# Patient Record
Sex: FEMALE | Race: WHITE | NOT HISPANIC OR LATINO | Employment: OTHER | URBAN - METROPOLITAN AREA
[De-identification: names, ages, dates, MRNs, and addresses within clinical notes are randomized per-mention and may not be internally consistent; named-entity substitution may affect disease eponyms.]

---

## 2017-06-22 ENCOUNTER — ALLSCRIPTS OFFICE VISIT (OUTPATIENT)
Dept: OTHER | Facility: OTHER | Age: 62
End: 2017-06-22

## 2017-10-16 DIAGNOSIS — R73.01 IMPAIRED FASTING GLUCOSE: ICD-10-CM

## 2017-10-16 DIAGNOSIS — E78.2 MIXED HYPERLIPIDEMIA: ICD-10-CM

## 2017-10-16 DIAGNOSIS — E87.6 HYPOKALEMIA: ICD-10-CM

## 2017-10-16 DIAGNOSIS — I10 ESSENTIAL (PRIMARY) HYPERTENSION: ICD-10-CM

## 2017-10-31 ENCOUNTER — ALLSCRIPTS OFFICE VISIT (OUTPATIENT)
Dept: OTHER | Facility: OTHER | Age: 62
End: 2017-10-31

## 2017-11-01 NOTE — PROGRESS NOTES
Assessment  1  Bunion of great toe of right foot (727 1) (M21 611)   2  Encounter for preventive health examination (V70 0) (Z00 00)    Plan  Need for influenza vaccination    · Fluzone Quadrivalent 0 5 ML Intramuscular Suspension Prefilled Syringe    Chief Complaint  mm - review labs    trouble with right foot    pap      History of Present Illness  64year old female notes bunion of right great toe  States that it is chronic pain worse with walking  Wears tight fitting shoes  Review of Systems    Constitutional: No fever, no chills, feels well, no tiredness, no recent weight gain or weight loss  Cardiovascular: No complaints of slow heart rate, no fast heart rate, no chest pain, no palpitations, no leg claudication, no lower extremity edema  Respiratory: No complaints of shortness of breath, no wheezing, no cough, no SOB on exertion, no orthopnea, no PND  Gastrointestinal: No complaints of abdominal pain, no constipation, no nausea or vomiting, no diarrhea, no bloody stools  Musculoskeletal: as noted in HPI  Integumentary: No complaints of skin rash or lesions, no itching, no skin wounds, no breast pain or lump  Neurological: No complaints of headache, no confusion, no convulsions, no numbness, no dizziness or fainting, no tingling, no limb weakness, no difficulty walking  Active Problems  1  Allergic rhinitis (477 9) (J30 9)   2  Arthropathy of knee (716 96) (M17 10)   3  Bacterial conjunctivitis of left eye (372 39,041 9) (H10 9)   4  Benign essential hypertension (401 1) (I10)   5  Bug bite (919 4,E906 4) (W57 XXXA)   6  Contact dermatitis (692 9) (L25 9)   7  Encounter for Papanicolaou smear for cervical cancer screening (V76 2) (Z12 4)   8  Encounter for screening mammogram for breast cancer (V76 12) (Z12 31)   9  Esophageal reflux (530 81) (K21 9)   10  Impaired fasting glucose (790 21) (R73 01)   11  Mixed hyperlipidemia (272 2) (E78 2)   12   Need for influenza vaccination (V04 81) (Z23)   13  Need for pneumococcal vaccine (V03 82) (Z23)   14  Need for shingles vaccine (V04 89) (Z23)   15  Osteopenia (733 90) (M85 80)   16  Potassium deficiency (276 8) (E87 6)   17  Pre-op exam (V72 84) (Z01 818)   18  Screening for HPV (human papillomavirus) (V73 81) (Z11 51)    Past Medical History    The active problems and past medical history were reviewed and updated today  Surgical History    The surgical history was reviewed and updated today  Family History  Mother    1  Family history of diabetes mellitus (V18 0) (Z83 3)  Family History    2  Denied: Family history of substance abuse   3  No family history of mental disorder    The family history was reviewed and updated today  Social History   · Former smoker (X53 20) (R35 080)  The social history was reviewed and updated today  The social history was reviewed and is unchanged  Current Meds   1  Alendronate Sodium 70 MG Oral Tablet; TAKE 1 TABLET EVERY WEEK; Therapy: 77MFM6671 to (Last Rx:06Mar2017)  Requested for: 04WMT8126; Status: ACTIVE   - Retrospective By Protocol Authorization Ordered   2  Azithromycin 250 MG Oral Tablet; TAKE 2 TABLETS ON DAY 1 THEN TAKE 1 TABLET A   DAY FOR 4 DAYS; Therapy: 43MOW2266 to (Last Rx:19Ywj5914)  Requested for: 74Rdt7719 Ordered   3  Esomeprazole Magnesium 40 MG Oral Capsule Delayed Release; take 1 capsule daily; Therapy: 23Cxh3666 to (Evaluate:98Qmx6586)  Requested for: 08AHC7151; Last   Rx:16Oct2017; Status: ACTIVE - Retrospective By Protocol Authorization Ordered   4  Losartan Potassium 100 MG Oral Tablet; Take 1 tablet daily; Therapy: 88YWC1502 to (Last Rx:49Oms2333)  Requested for: 60FYW9653 Ordered   5  Nasacort AQ 55 MCG/ACT AERS; Therapy: (([phone]) to Recorded   6  Triamcinolone Acetonide 0 1 % External Ointment; APPLY AND GENTLY MASSAGE INTO   AFFECTED AREA(S) TWICE DAILY;    Therapy: 63Aaq6091 to (Last Rx:27Apr2016)  Requested for: 69Ykt1290 Ordered    The medication list was reviewed and updated today  Allergies  1  No Known Drug Allergies    Vitals  Vital Signs    Recorded: 77VGP6408 03:34PM   Heart Rate 94   Respiration 16   Systolic 582   Diastolic 80   Height 5 ft 1 in   Weight 122 lb    BMI Calculated 23 05   BSA Calculated 1 53   O2 Saturation 98     Physical Exam    Constitutional   General appearance: No acute distress, well appearing and well nourished  Pulmonary   Respiratory effort: No increased work of breathing or signs of respiratory distress  Auscultation of lungs: Clear to auscultation  Cardiovascular   Auscultation of heart: Normal rate and rhythm, normal S1 and S2, without murmurs  Examination of extremities for edema and/or varicosities: Normal     Lymphatic   Palpation of lymph nodes in neck: No lymphadenopathy  Musculoskeletal   Gait and station: Normal     Digits and nails: Abnormal  -- Bunion of great toe on right foot  Inspection/palpation of joints, bones, and muscles: Normal     Skin   Skin and subcutaneous tissue: Normal without rashes or lesions  Psychiatric   Orientation to person, place, and time: Normal     Mood and affect: Normal          Health Management  Encounter for Papanicolaou smear for cervical cancer screening   (every) [facility] FPGS PAP AND HR HPV DNA; every 5 years; Last 46VYS5454; Next Due:  64VBH3280; Active  Screening for HPV (human papillomavirus)   (every) [facility] FPGS PAP AND HR HPV DNA; every 5 years; Last 85CXN8299; Next Due:  35TLD9783;  Active    Signatures   Electronically signed by : Eliu Galindo MD; Oct 31 2017  4:00PM EST                       (Author)

## 2017-11-14 ENCOUNTER — ALLSCRIPTS OFFICE VISIT (OUTPATIENT)
Dept: OTHER | Facility: OTHER | Age: 62
End: 2017-11-14

## 2017-11-16 LAB
ADEQUACY: (HISTORICAL): NORMAL
CLINICIAN PROVIDIED ICD 9 OR 10 (HISTORICAL): NORMAL
COMMENT (HISTORICAL): NORMAL
DIAGNOSIS (HISTORICAL): NORMAL
HPV HIGH RISK (HISTORICAL): NEGATIVE
PERFORMED BY (HISTORICAL): NORMAL
TEST INFORMATION (HISTORICAL): NORMAL

## 2017-11-17 ENCOUNTER — GENERIC CONVERSION - ENCOUNTER (OUTPATIENT)
Dept: OTHER | Facility: OTHER | Age: 62
End: 2017-11-17

## 2018-01-10 NOTE — MISCELLANEOUS
Assessment    1  Benign essential hypertension (401 1) (I10)    Plan  Benign essential hypertension    · Losartan Potassium 100 MG Oral Tablet; Take 1 tablet daily   Rx By: Rajani Cespedes; Dispense: 0 Days ; #:90 Tablet; Refill: 3; For: Benign essential hypertension; TIMI = N; Sent To: [facility]  Mixed hyperlipidemia    · Klor-Con 10 10 MEQ Oral Tablet Extended Release   Rx By: Rajanilizz CasianoEl Refugio; Dispense: 0 Days ; #:90 TAB; Refill: 1; For: Mixed hyperlipidemia; TIMI = N; Transmitted To: [facility]  Potassium deficiency    · Potassium Chloride ER 10 MEQ Oral Tablet Extended Release   Rx By: Rajani El Refugio; Dispense: 0 Days ; #:90 TAB; Refill: 0; For: Potassium deficiency; TIMI = N; Transmitted To: [facility]    Chief Complaint  Chief Complaint Free Text Note Form: HARINDER - TKR      History of Present Illness  TCM Communication [facility]: The patient is being contacted for follow-up after hospitalization and knee replcaement  Hospital course was discussed with the inpatient physician and records were reviewed  She was hospitalized at [facility]  The date of admission: 10/26/16, date of discharge: 11/3/16  Communication performed and completed by   HPI: f/u after hosp--rev meds      Review of Systems  Complete-Female:   Constitutional: No fever, no chills, feels well, no tiredness, no recent weight gain or weight loss  Cardiovascular: No complaints of slow heart rate, no fast heart rate, no chest pain, no palpitations, no leg claudication, no lower extremity edema  Respiratory: No complaints of shortness of breath, no wheezing, no cough, no SOB on exertion, no orthopnea, no PND  Musculoskeletal: as noted in HPI  Active Problems    1  Allergic rhinitis (477 9) (J30 9)   2  Arthropathy of knee (716 96) (M12 9)   3  Bacterial conjunctivitis of left eye (372 39,041 9) (H10 9)   4  Benign essential hypertension (401 1) (I10)   5   Contact dermatitis (692 9) (L25 9)   6  Encounter for screening mammogram for breast cancer (V76 12) (Z12 31)   7  Esophageal reflux (530 81) (K21 9)   8  Impaired fasting glucose (790 21) (R73 01)   9  Mixed hyperlipidemia (272 2) (E78 2)   10  Need for influenza vaccination (V04 81) (Z23)   11  Need for pneumococcal vaccine (V03 82) (Z23)   12  Need for shingles vaccine (V04 89) (Z23)   13  Osteopenia (733 90) (M85 80)   14  Potassium deficiency (276 8) (E87 6)   15  Pre-op exam (V72 84) (Z01 818)    Family History  Mother    1  Family history of diabetes mellitus (V18 0) (Z83 3)    Social History    · Former smoker (V15 82) (S24 833)    Current Meds   1  Alendronate Sodium 70 MG Oral Tablet; TAKE 1 TABLET EVERY WEEK; Therapy: 09WCM1463 to (Last Bhavesh Riggins)  Requested for: 28Jun2016 Ordered   2  Esomeprazole Magnesium 40 MG Oral Capsule Delayed Release; take 1 capsule daily; Therapy: 21Apr2016 to (Evaluate:16Jan2017)  Requested for: 21Apr2016; Last   Rx:21Apr2016 Ordered   3  Klor-Con 10 10 MEQ Oral Tablet Extended Release; Take 1 tablet daily; Therapy: 72Zwk5717 to (Last Vernell Cole)  Requested for: 84Qje7449 Ordered   4  Losartan Potassium 100 MG Oral Tablet; Take 1 tablet daily; Therapy: 83IJK1356 to (Last Rx:30Xzl5713)  Requested for: 55BSW2317 Ordered   5  Nasacort AQ 55 MCG/ACT AERS; Therapy: (78 119 58 38) to Recorded   6  Potassium Chloride ER 10 MEQ Oral Tablet Extended Release; Take 1 tablet daily; Therapy: 38VMD0052 to (Last Rx:23Nov2016)  Requested for: 23Nov2016 Ordered   7  Triamcinolone Acetonide 0 1 % External Ointment; APPLY AND GENTLY MASSAGE INTO   AFFECTED AREA(S) TWICE DAILY; Therapy: 27Apr2016 to (Last Rx:27Apr2016)  Requested for: 27Apr2016 Ordered    Allergies    1   No Known Drug Allergies    Vitals  Signs   Recorded: 66JIZ2975 07:28AM   Heart Rate: 84  Respiration: 16  Systolic: 223  Diastolic: 90  Height: 5 ft 1 in  Weight: 136 lb   BMI Calculated: 25 7  BSA Calculated: 1 6  O2 Saturation: 97    Physical Exam    Constitutional   General appearance: No acute distress, well appearing and well nourished  Pulmonary   Auscultation of lungs: Clear to auscultation  Cardiovascular   Auscultation of heart: Normal rate and rhythm, normal S1 and S2, without murmurs  Abdomen   Abdomen: Non-tender, no masses  Results/Data  PHQ-2 Adult Depression Screening 00Atk0465 07:29AM User, Ahs     Test Name Result Flag Reference   PHQ-2 Adult Depression Score 0     Over the last two weeks, how often have you been bothered by any of the following problems?   Little interest or pleasure in doing things: Not at all - 0  Feeling down, depressed, or hopeless: Not at all - 0   PHQ-2 Adult Depression Screening Negative         Signatures   Electronically signed by : Sonya Burgess MD; Dec  1 2016  7:48AM EST                       (Author)

## 2018-01-12 VITALS
HEIGHT: 61 IN | BODY MASS INDEX: 23.03 KG/M2 | OXYGEN SATURATION: 98 % | HEART RATE: 94 BPM | SYSTOLIC BLOOD PRESSURE: 120 MMHG | RESPIRATION RATE: 16 BRPM | WEIGHT: 122 LBS | DIASTOLIC BLOOD PRESSURE: 80 MMHG

## 2018-01-13 VITALS
HEART RATE: 76 BPM | OXYGEN SATURATION: 98 % | DIASTOLIC BLOOD PRESSURE: 60 MMHG | BODY MASS INDEX: 23.03 KG/M2 | HEIGHT: 61 IN | SYSTOLIC BLOOD PRESSURE: 110 MMHG | WEIGHT: 122 LBS | RESPIRATION RATE: 16 BRPM

## 2018-01-15 NOTE — RESULT NOTES
Verified Results  (LC) PapIG, HPV, rfx 16/18 28ZPF9408 12:00AM [facility]   No  of containers  Jimbo Manifold 01 ThinPrep Vial     Test Name Result Flag Reference   DIAGNOSIS:      NEGATIVE FOR INTRAEPITHELIAL LESION AND MALIGNANCY  NEGATIVE FOR INTRAEPITHELIAL LESION AND MALIGNANCY  Specimen adequacy:      Satisfactory for evaluation  Endocervical component may not be  distinguished in cases of atrophy  Satisfactory for evaluation  Endocervical component may not be  distinguished in cases of atrophy  Clinician provided ICD10: I90 644  Z12 4     Z01 419  Z12 4   Performed by:      Gera Fan Cytotechnologist   Gera Fan Cytotechnologist   Comm   Note: (Report)     The Pap smear is a screening test designed to aid in the detection of  premalignant and malignant conditions of the uterine cervix  It is not a  diagnostic procedure and should not be used as the sole means of detecting  cervical cancer  Both false-positive and false-negative reports do occur  The Pap smear is a screening test designed to aid in the detection of  premalignant and malignant conditions of the uterine cervix  It is not a  diagnostic procedure and should not be used as the sole means of detecting  cervical cancer  Both false-positive and false-negative reports do occur  Test Methodology: This liquid based ThinPrep(R) pap test was screened with the  use of an image guided system  This liquid based ThinPrep(R) pap test was screened with the  use of an image guided system  HPV, high-risk Negative  Negative   This high-risk HPV test detects thirteen high-risk types  (16/18/31/33/35/39/45/51/52/56/58/59/68) without differentiation  DIAGNOSIS:      NEGATIVE FOR INTRAEPITHELIAL LESION AND MALIGNANCY  NEGATIVE FOR INTRAEPITHELIAL LESION AND MALIGNANCY  Specimen adequacy:      Satisfactory for evaluation  Endocervical component may not be  distinguished in cases of atrophy  Satisfactory for evaluation  Endocervical component may not be  distinguished in cases of atrophy  Clinician provided ICD10: C85 741  Z12 4     Z01 419  Z12 4   Performed by:      Kael Foss Cytotechnologist   Kael Foss Cytotechnologist   Comm   Note: (Report)     The Pap smear is a screening test designed to aid in the detection of  premalignant and malignant conditions of the uterine cervix  It is not a  diagnostic procedure and should not be used as the sole means of detecting  cervical cancer  Both false-positive and false-negative reports do occur  The Pap smear is a screening test designed to aid in the detection of  premalignant and malignant conditions of the uterine cervix  It is not a  diagnostic procedure and should not be used as the sole means of detecting  cervical cancer  Both false-positive and false-negative reports do occur  Test Methodology: This liquid based ThinPrep(R) pap test was screened with the  use of an image guided system  This liquid based ThinPrep(R) pap test was screened with the  use of an image guided system  HPV, high-risk Negative  Negative   This high-risk HPV test detects thirteen high-risk types  (16/18/31/33/35/39/45/51/52/56/58/59/68) without differentiation

## 2018-01-16 NOTE — PROGRESS NOTES
Assessment    1  Bacterial conjunctivitis of left eye (372 39,041 9) (H10 9)    Plan  Bacterial conjunctivitis of left eye    · Tobramycin 0 3 % Ophthalmic Solution; INITIALLY 2 DROPS EVERY HOUR FOR  DAY 1, THEN 2 DROPS 4 TIMES DAILY    Chief Complaint  PINK EYE LEFT EYE      History of Present Illness  HPI: had a stye in left eye yesterday, was itching eye, today woke up with left eye crusted shut, drainage, burning      Review of Systems    Constitutional: no fever and no chills  ENT: nasal discharge, but no sore throat    The patient presents with complaints of mild left earache  Respiratory: cough  Breasts: no complaints of breast pain, breast lump or nipple discharge  Gastrointestinal: no complaints of abdominal pain, no constipation, no nausea or diarrhea, no vomiting, no bloody stools  Genitourinary: no complaints of dysuria, no incontinence, no pelvic pain, no dysmenorrhea, no vaginal discharge or abnormal vaginal bleeding  Musculoskeletal: no complaints of arthralgia, no myalgia, no joint swelling or stiffness, no limb pain or swelling  Integumentary: no complaints of skin rash or lesion, no itching or dry skin, no skin wounds  Neurological: no complaints of headache, no confusion, no numbness or tingling, no dizziness or fainting  Active Problems    1  Allergic rhinitis (477 9) (J30 9)   2  Benign essential hypertension (401 1) (I10)   3  Esophageal reflux (530 81) (K21 9)   4  Impaired fasting glucose (790 21) (R73 01)   5  Mixed hyperlipidemia (272 2) (E78 2)   6  Need for influenza vaccination (V04 81) (Z23)   7  Need for pneumococcal vaccine (V03 82) (Z23)    Past Medical History  Active Problems And Past Medical History Reviewed: The active problems and past medical history were reviewed and updated today  Family History    1  Family history of diabetes mellitus (V18 0) (Z83 3)  Family History Reviewed: The family history was reviewed and updated today         Social History    · Former smoker (S16 73) (N01 429)   · QUIT 11/1/2013  The social history was reviewed and updated today  Surgical History  Surgical History Reviewed: The surgical history was reviewed and updated today  Current Meds   1  Alendronate Sodium 70 MG Oral Tablet; TAKE 1 TABLET ONCE WEEKLY; Therapy: (Recorded:09Oct2015) to Recorded   2  Esomeprazole Magnesium 40 MG Oral Capsule Delayed Release; TAKE 1 CAPSULE   DAILY; Therapy: (Recorded:09Oct2015) to Recorded   3  Klor-Con 10 10 MEQ Oral Tablet Extended Release; Take 1 tablet daily; Therapy: 21Dec2015 to (Last Turner Hurl)  Requested for: 21Dec2015 Ordered   4  Losartan Potassium 100 MG Oral Tablet; TAKE 1 TABLET DAILY; Therapy: (Recorded:09Oct2015) to Recorded   5  Nasacort AQ 55 MCG/ACT AERS; Therapy: (Recorded:09Oct2015) to Recorded    The medication list was reviewed and updated today  Allergies    1  No Known Drug Allergies    Vitals   Recorded: 11YCA6635 69:37PP   Systolic 352   Diastolic 74   Height 5 ft 1 in   Weight 136 lb    BMI Calculated 25 7   BSA Calculated 1 6     Physical Exam    Constitutional   General appearance: No acute distress, well appearing and well nourished  Eyes   Conjunctiva and lids: Abnormal   Conjunctiva Findings: left hyperemia, purulent discharge on the left and increased tearing on the left, but normal right conjunctiva  Eye Lids: normal bilaterally  Ears, Nose, Mouth, and Throat   External inspection of ears and nose: Normal     Otoscopic examination: Tympanic membranes translucent with normal light reflex  Canals patent without erythema  Nasal mucosa, septum, and turbinates: Normal without edema or erythema  Oropharynx: Normal with no erythema, edema, exudate or lesions  Pulmonary   Respiratory effort: No increased work of breathing or signs of respiratory distress  Auscultation of lungs: Clear to auscultation      Cardiovascular   Auscultation of heart: Normal rate and rhythm, normal S1 and S2, without murmurs      Musculoskeletal   Gait and station: Normal     Psychiatric   Orientation to person, place, and time: Normal     Mood and affect: Normal          Signatures   Electronically signed by : Adalberto Ramsye; Jan 29 2016  1:09PM EST                       (Author)    Electronically signed by : Rodger Sapp MD; Jan 29 2016  5:06PM EST

## 2018-01-23 NOTE — PROGRESS NOTES
Assessment   1  Bunion of great toe of right foot (727 1) (M21 611)  2  Encounter for preventive health examination (V70 0) (Z00 00)    Plan  Need for influenza vaccination    · Administered: Fluzone Quadrivalent 0 5 ML Intramuscular Suspension Prefilled Syringe    Discussion/Summary  health maintenance visit Currently, she eats a healthy diet  Advice and education were given regarding nutrition, aerobic exercise, weight bearing exercise, weight loss and sunscreen use  Chief Complaint  annual hm    Advance Directives  Advance Directive [facility]:   NO - Patient does not have an advance health care directive  1         1 Amended By: Leighann Lee; Oct 31 2017 4:47 PM EST    History of Present Illness    HM, Adult Female: The patient is being seen for a health maintenance evaluation  Social History:1  Household members include1  spouse1 , 1 daughter(s)1  and 2 son(s)1   She is1  married1   Work status:1  working full time1   The patient  is a former cigarette smoker1  1   She reports  occasional alcohol use1  and  denies binge drinking1  1   Alcohol concern: 1  no personal concern about alcohol use1   General Health: The patient's health since the last visit is described as1  good1   She has regular dental visits1   She complains of vision problems1   Vision care includes1  wearing glasses1   She denies hearing loss1   Immunizations status:1  up to date1   Lifestyle:1   She consumes a diverse and healthy diet1   Reproductive health:1  the patient is postmenopausal1   Screenin Amended By: Leighann Lee; Oct 31 2017 4:48 PM EST    Review of Systems    Constitutional: No fever, no chills, feels well, no tiredness, no recent weight gain or weight loss  Cardiovascular: No complaints of slow heart rate, no fast heart rate, no chest pain, no palpitations, no leg claudication, no lower extremity edema     Respiratory: No complaints of shortness of breath, no wheezing, no cough, no SOB on exertion, no orthopnea, no PND  Gastrointestinal: No complaints of abdominal pain, no constipation, no nausea or vomiting, no diarrhea, no bloody stools  Integumentary: No complaints of skin rash or lesions, no itching, no skin wounds, no breast pain or lump  Active Problems   1  Allergic rhinitis (477 9) (J30 9)  2  Arthropathy of knee (716 96) (M17 10)  3  Bacterial conjunctivitis of left eye (372 39,041 9) (H10 9)  4  Benign essential hypertension (401 1) (I10)  5  Bug bite (919 4,E906 4) (W57 XXXA)  6  Contact dermatitis (692 9) (L25 9)  7  Encounter for Papanicolaou smear for cervical cancer screening (V76 2) (Z12 4)  8  Encounter for screening mammogram for breast cancer (V76 12) (Z12 31)  9  Esophageal reflux (530 81) (K21 9)  10  Impaired fasting glucose (790 21) (R73 01)  11  Mixed hyperlipidemia (272 2) (E78 2)  12  Need for influenza vaccination (V04 81) (Z23)  13  Need for pneumococcal vaccine (V03 82) (Z23)  14  Need for shingles vaccine (V04 89) (Z23)  15  Osteopenia (733 90) (M85 80)  16  Potassium deficiency (276 8) (E87 6)  17  Pre-op exam (V72 84) (Z01 818)  18  Screening for HPV (human papillomavirus) (V73 81) (Z11 51)    Family History  Mother    · Family history of diabetes mellitus (V18 0) (Z83 3)  Family History    · Denied: Family history of substance abuse   · No family history of mental disorder    Social History    · Former smoker (B64 12) (I14 165)   · QUIT 11/1/2013    Current Meds  1  Alendronate Sodium 70 MG Oral Tablet; TAKE 1 TABLET EVERY WEEK; Therapy: 13PDN5673 to (Last Rx:06Mar2017)  Requested for: 42IBV1317; Status:   ACTIVE - Retrospective By Protocol Authorization Ordered  2  Azithromycin 250 MG Oral Tablet; TAKE 2 TABLETS ON DAY 1 THEN TAKE 1 TABLET A   DAY FOR 4 DAYS; Therapy: 29YCY7489 to (Last Rx:19Sor2929)  Requested for: 93Bib8057 Ordered  3  Esomeprazole Magnesium 40 MG Oral Capsule Delayed Release; take 1 capsule daily;    Therapy: 21Apr2016 to (Evaluate:93Zzd7266)  Requested for: 04KVZ2895; Last   Rx:16Oct2017; Status: ACTIVE - Retrospective By Protocol Authorization Ordered  4  Losartan Potassium 100 MG Oral Tablet; Take 1 tablet daily; Therapy: 27RRB3813 to (Last Rx:30Yxo3767)  Requested for: 68RBX3640 Ordered  5  Nasacort AQ 55 MCG/ACT AERS; Therapy: (445 89 163) to Recorded  6  Triamcinolone Acetonide 0 1 % External Ointment; APPLY AND GENTLY MASSAGE   INTO AFFECTED AREA(S) TWICE DAILY; Therapy: 15Nma9154 to (Last Rx:50Pea0844)  Requested for: 10Amk3375 Ordered    Allergies   1  No Known Drug Allergies    Vitals   Recorded: 34RBK5872 03:34PM   Heart Rate 94   Respiration 16   Systolic 258   Diastolic 80   Height 5 ft 1 in   Weight 122 lb    BMI Calculated 23 05   BSA Calculated 1 53   O2 Saturation 98     Physical Exam    Constitutional   General appearance: No acute distress, well appearing and well nourished  Neck   Neck: Supple, symmetric, trachea midline, no masses  Thyroid: Normal, no thyromegaly  Pulmonary   Respiratory effort: No increased work of breathing or signs of respiratory distress  Auscultation of lungs: Clear to auscultation  Cardiovascular   Auscultation of heart: Normal rate and rhythm, normal S1 and S2, no murmurs  Examination of extremities for edema and/or varicosities: Normal     Lymphatic   Palpation of lymph nodes in neck: No lymphadenopathy  Musculoskeletal   Gait and station: Normal     Digits and nails: Abnormal   bunion of right great toe  Joints, bones, and muscles: Normal     Skin   Skin and subcutaneous tissue: Normal without rashes or lesions  Palpation of skin and subcutaneous tissue: Normal turgor  Psychiatric   Orientation to person, place, and time: Normal     Mood and affect: Normal        Health Management  Encounter for Papanicolaou smear for cervical cancer screening   (every) [employer] FPGS PAP AND HR HPV DNA; every 5 years; Last 31BXC4396;  Next  Due: 30YIU2269; Active  Screening for HPV (human papillomavirus)   (every) [employer] FPGS PAP AND HR HPV DNA; every 5 years; Last 97DPS7782; Next  Due: 25OOP4232;  Active    Signatures   Electronically signed by : Colleen Blair MD; Nov 2 2017  5:48AM EST                       (Author)

## 2018-02-07 ENCOUNTER — OFFICE VISIT (OUTPATIENT)
Dept: FAMILY MEDICINE CLINIC | Facility: CLINIC | Age: 63
End: 2018-02-07
Payer: COMMERCIAL

## 2018-02-07 VITALS
DIASTOLIC BLOOD PRESSURE: 60 MMHG | RESPIRATION RATE: 16 BRPM | BODY MASS INDEX: 22.66 KG/M2 | HEIGHT: 61 IN | WEIGHT: 120 LBS | OXYGEN SATURATION: 97 % | TEMPERATURE: 98.1 F | HEART RATE: 98 BPM | SYSTOLIC BLOOD PRESSURE: 108 MMHG

## 2018-02-07 DIAGNOSIS — J01.01 ACUTE RECURRENT MAXILLARY SINUSITIS: Primary | ICD-10-CM

## 2018-02-07 PROBLEM — M21.611 BUNION OF GREAT TOE OF RIGHT FOOT: Status: ACTIVE | Noted: 2017-10-31

## 2018-02-07 PROCEDURE — 99213 OFFICE O/P EST LOW 20 MIN: CPT | Performed by: FAMILY MEDICINE

## 2018-02-07 RX ORDER — ALENDRONATE SODIUM 70 MG/1
1 TABLET ORAL WEEKLY
COMMUNITY
Start: 2016-06-28 | End: 2018-05-03 | Stop reason: SDUPTHER

## 2018-02-07 RX ORDER — AMOXICILLIN 500 MG/1
500 CAPSULE ORAL 3 TIMES DAILY
Qty: 30 CAPSULE | Refills: 0 | Status: SHIPPED | OUTPATIENT
Start: 2018-02-07 | End: 2018-02-17

## 2018-02-07 RX ORDER — ESOMEPRAZOLE MAGNESIUM 40 MG/1
1 CAPSULE, DELAYED RELEASE ORAL DAILY
COMMUNITY
Start: 2016-04-21 | End: 2018-04-14 | Stop reason: SDUPTHER

## 2018-02-07 RX ORDER — FEXOFENADINE HCL 180 MG/1
TABLET ORAL
COMMUNITY

## 2018-02-07 RX ORDER — LOSARTAN POTASSIUM 100 MG/1
1 TABLET ORAL DAILY
COMMUNITY
Start: 2016-05-11 | End: 2018-05-26 | Stop reason: SDUPTHER

## 2018-02-07 RX ORDER — GUAIFENESIN AND CODEINE PHOSPHATE 100; 10 MG/5ML; MG/5ML
10 SOLUTION ORAL 4 TIMES DAILY PRN
Qty: 240 ML | Refills: 0 | Status: SHIPPED | OUTPATIENT
Start: 2018-02-07 | End: 2018-11-14

## 2018-02-07 NOTE — PROGRESS NOTES
Assessment/Plan:    No problem-specific Assessment & Plan notes found for this encounter  Diagnoses and all orders for this visit:    Acute recurrent maxillary sinusitis    Other orders  -     alendronate (FOSAMAX) 70 mg tablet; Take 1 tablet by mouth once a week  -     esomeprazole (NexIUM) 40 MG capsule; Take 1 capsule by mouth daily  -     losartan (COZAAR) 100 MG tablet; Take 1 tablet by mouth daily  -     fexofenadine (ALLEGRA) 180 MG tablet; Take by mouth  -     triamcinolone (KENALOG) 0 1 % ointment; Apply topically 2 (two) times a day        Patient Instructions     Take antibiotic for the full 10 days  Use the cough syrup as needed for cough  Use Tylenol or ibuprofen for aches pains and fevers  Maintain your hydration  Recheck as needed  Subjective:      Patient ID: Yvonne Shah is a 58 y o  female  Ill over the last 2-3 days  Sinus congestion and chest congestion  Some productive cough with purulent nasal discharge  Patient feels feverish, with chills, and some body aches  Did minimal relief with over-the-counter medications  Generalized Body Aches   Associated symptoms include congestion, rhinorrhea, a sore throat, coughing and vomiting  Pertinent negatives include no ear discharge, ear pain, eye discharge, eye itching, eye redness, fever, chest pain, shortness of breath, wheezing, abdominal pain, diarrhea or nausea  The following portions of the patient's history were reviewed and updated as appropriate: allergies, current medications, past family history, past medical history, past social history and problem list     Review of Systems   Constitutional: Positive for appetite change, chills and diaphoresis  Negative for fever  HENT: Positive for congestion, rhinorrhea, sinus pressure and sore throat  Negative for ear discharge and ear pain  Eyes: Negative for discharge, redness and itching  Respiratory: Positive for cough   Negative for shortness of breath and wheezing  Cardiovascular: Negative for chest pain and palpitations  Rapid or slow heart rate   Gastrointestinal: Positive for vomiting  Negative for abdominal pain, diarrhea and nausea  Vitals:    02/07/18 0917   BP: 108/60   Pulse: 98   Resp: 16   Temp: 98 1 °F (36 7 °C)   SpO2: 97%     Objective:     Physical Exam   Constitutional: She appears well-developed and well-nourished  No distress  HENT:   Head: Normocephalic and atraumatic  Right Ear: Tympanic membrane and external ear normal  No drainage  Left Ear: Tympanic membrane normal  There is drainage  Nose: Rhinorrhea and sinus tenderness present  Right sinus exhibits maxillary sinus tenderness and frontal sinus tenderness  Left sinus exhibits maxillary sinus tenderness and frontal sinus tenderness  Mouth/Throat: Posterior oropharyngeal erythema present  No oropharyngeal exudate  Eyes: Conjunctivae and EOM are normal  Right eye exhibits no discharge  Left eye exhibits no discharge  Neck: Normal range of motion  Neck supple  No thyromegaly present  Cardiovascular: Normal rate, regular rhythm and normal heart sounds  Pulmonary/Chest: Effort normal  No respiratory distress  She has no wheezes  She has no rales  Abdominal: Soft  There is no tenderness  There is no rebound and no guarding  Lymphadenopathy:     She has cervical adenopathy  Skin: Skin is warm and dry

## 2018-02-07 NOTE — PATIENT INSTRUCTIONS
Take antibiotic for the full 10 days  Use the cough syrup as needed for cough  Use Tylenol or ibuprofen for aches pains and fevers  Maintain your hydration  Recheck as needed

## 2018-04-14 DIAGNOSIS — K21.00 GASTROESOPHAGEAL REFLUX DISEASE WITH ESOPHAGITIS: Primary | ICD-10-CM

## 2018-04-14 RX ORDER — ESOMEPRAZOLE MAGNESIUM 40 MG/1
CAPSULE, DELAYED RELEASE ORAL
Qty: 90 CAPSULE | Refills: 1 | Status: SHIPPED | OUTPATIENT
Start: 2018-04-14 | End: 2018-10-11 | Stop reason: SDUPTHER

## 2018-05-03 DIAGNOSIS — M81.0 OSTEOPOROSIS, UNSPECIFIED OSTEOPOROSIS TYPE, UNSPECIFIED PATHOLOGICAL FRACTURE PRESENCE: Primary | ICD-10-CM

## 2018-05-03 RX ORDER — ALENDRONATE SODIUM 70 MG/1
TABLET ORAL
Qty: 12 TABLET | Refills: 5 | Status: SHIPPED | OUTPATIENT
Start: 2018-05-03 | End: 2019-02-22

## 2018-05-26 DIAGNOSIS — I10 ESSENTIAL HYPERTENSION: Primary | ICD-10-CM

## 2018-05-29 RX ORDER — LOSARTAN POTASSIUM 100 MG/1
TABLET ORAL
Qty: 90 TABLET | Refills: 1 | Status: SHIPPED | OUTPATIENT
Start: 2018-05-29 | End: 2018-11-25 | Stop reason: SDUPTHER

## 2018-06-05 ENCOUNTER — OFFICE VISIT (OUTPATIENT)
Dept: FAMILY MEDICINE CLINIC | Facility: CLINIC | Age: 63
End: 2018-06-05
Payer: COMMERCIAL

## 2018-06-05 VITALS
HEIGHT: 61 IN | BODY MASS INDEX: 23.6 KG/M2 | WEIGHT: 125 LBS | SYSTOLIC BLOOD PRESSURE: 130 MMHG | DIASTOLIC BLOOD PRESSURE: 80 MMHG

## 2018-06-05 DIAGNOSIS — B34.9 VIRAL SYNDROME: Primary | ICD-10-CM

## 2018-06-05 PROCEDURE — 99213 OFFICE O/P EST LOW 20 MIN: CPT | Performed by: FAMILY MEDICINE

## 2018-06-05 PROCEDURE — 3008F BODY MASS INDEX DOCD: CPT | Performed by: FAMILY MEDICINE

## 2018-06-05 NOTE — PROGRESS NOTES
[id number]         NAME: Jill Patel is a 58 y o  female  : 1955    MRN: [id number]  DATE: 2018  TIME: 3:33 PM    Assessment and Plan   Viral syndrome [B34 9]  1  Viral syndrome           Patient Instructions     Patient Instructions   Qvar samples---pred if no better--? CXR          Chief Complaint     Chief Complaint   Patient presents with    Cough     COUGH         History of Present Illness       c/o cough--1 wk      Cough   Pertinent negatives include no chest pain, chills, ear pain, eye redness, fever, rhinorrhea, sore throat, shortness of breath or wheezing  Review of Systems   Review of Systems   Constitutional: Negative for appetite change, chills, diaphoresis and fever  HENT: Negative for ear pain, rhinorrhea, sinus pressure and sore throat  Eyes: Negative for discharge, redness and itching  Respiratory: Positive for cough  Negative for shortness of breath and wheezing  Cardiovascular: Negative for chest pain and palpitations  Gastrointestinal: Negative for abdominal pain, diarrhea, nausea and vomiting           Current Medications       Current Outpatient Prescriptions:     alendronate (FOSAMAX) 70 mg tablet, TAKE 1 TABLET EVERY WEEK, Disp: 12 tablet, Rfl: 5    esomeprazole (NexIUM) 40 MG capsule, TAKE 1 CAPSULE DAILY, Disp: 90 capsule, Rfl: 1    fexofenadine (ALLEGRA) 180 MG tablet, Take by mouth, Disp: , Rfl:     guaifenesin-codeine (GUAIFENESIN AC) 100-10 MG/5ML liquid, Take 10 mL by mouth 4 (four) times a day as needed for cough, Disp: 240 mL, Rfl: 0    losartan (COZAAR) 100 MG tablet, TAKE 1 TABLET DAILY, Disp: 90 tablet, Rfl: 1    triamcinolone (KENALOG) 0 1 % ointment, Apply topically 2 (two) times a day, Disp: , Rfl:     Current Allergies     Allergies as of 2018 - Reviewed 2018   Allergen Reaction Noted    Dog epithelium allergy skin test Cough 2017            The following portions of the patient's history were reviewed and updated as appropriate: allergies, current medications, past family history, past medical history, past social history, past surgical history and problem list      No past medical history on file  No past surgical history on file  Family History   Problem Relation Age of Onset    Liver cancer Mother     Hypertension Mother     Hypertension Father     Diabetes Paternal Grandfather     Substance Abuse Neg Hx     Mental illness Neg Hx          Medications have been verified  Objective   /80   Ht 5' 1" (1 549 m)   Wt 56 7 kg (125 lb)   BMI 23 62 kg/m²        Physical Exam     Physical Exam   Constitutional: She appears well-developed and well-nourished  HENT:   Right Ear: Tympanic membrane, external ear and ear canal normal  Tympanic membrane is not injected  Left Ear: Tympanic membrane, external ear and ear canal normal  Tympanic membrane is not injected  Nose: Nose normal    Mouth/Throat: Oropharynx is clear and moist and mucous membranes are normal    Eyes: Conjunctivae are normal  Pupils are equal, round, and reactive to light  Neck: Normal range of motion  Neck supple  No thyromegaly present  Cardiovascular: Normal rate, regular rhythm, normal heart sounds and intact distal pulses  Pulmonary/Chest: Effort normal and breath sounds normal  She has no wheezes  Nursing note and vitals reviewed

## 2018-08-27 ENCOUNTER — TELEPHONE (OUTPATIENT)
Dept: FAMILY MEDICINE CLINIC | Facility: CLINIC | Age: 63
End: 2018-08-27

## 2018-08-27 DIAGNOSIS — R53.83 FATIGUE, UNSPECIFIED TYPE: Primary | ICD-10-CM

## 2018-08-29 DIAGNOSIS — R73.01 IFG (IMPAIRED FASTING GLUCOSE): ICD-10-CM

## 2018-08-29 DIAGNOSIS — M81.0 OSTEOPOROSIS WITHOUT CURRENT PATHOLOGICAL FRACTURE, UNSPECIFIED OSTEOPOROSIS TYPE: ICD-10-CM

## 2018-08-29 DIAGNOSIS — E78.5 HYPERLIPIDEMIA, UNSPECIFIED HYPERLIPIDEMIA TYPE: ICD-10-CM

## 2018-08-29 DIAGNOSIS — E03.9 HYPOTHYROIDISM, UNSPECIFIED TYPE: ICD-10-CM

## 2018-08-29 DIAGNOSIS — D64.9 ANEMIA, UNSPECIFIED TYPE: ICD-10-CM

## 2018-08-29 LAB
BASOPHILS # BLD AUTO: 0 X10E3/UL (ref 0–0.2)
BASOPHILS NFR BLD AUTO: 1 %
EOSINOPHIL # BLD AUTO: 0.2 X10E3/UL (ref 0–0.4)
EOSINOPHIL NFR BLD AUTO: 4 %
ERYTHROCYTE [DISTWIDTH] IN BLOOD BY AUTOMATED COUNT: 14.5 % (ref 12.3–15.4)
HCT VFR BLD AUTO: 32.3 % (ref 34–46.6)
HGB BLD-MCNC: 10.8 G/DL (ref 11.1–15.9)
IMM GRANULOCYTES # BLD: 0 X10E3/UL (ref 0–0.1)
IMM GRANULOCYTES NFR BLD: 0 %
LYMPHOCYTES # BLD AUTO: 1.5 X10E3/UL (ref 0.7–3.1)
LYMPHOCYTES NFR BLD AUTO: 29 %
MCH RBC QN AUTO: 29.8 PG (ref 26.6–33)
MCHC RBC AUTO-ENTMCNC: 33.4 G/DL (ref 31.5–35.7)
MCV RBC AUTO: 89 FL (ref 79–97)
MONOCYTES # BLD AUTO: 0.5 X10E3/UL (ref 0.1–0.9)
MONOCYTES NFR BLD AUTO: 10 %
NEUTROPHILS # BLD AUTO: 2.8 X10E3/UL (ref 1.4–7)
NEUTROPHILS NFR BLD AUTO: 56 %
PLATELET # BLD AUTO: 258 X10E3/UL (ref 150–379)
RBC # BLD AUTO: 3.62 X10E6/UL (ref 3.77–5.28)
TSH SERPL DL<=0.005 MIU/L-ACNC: 4.45 UIU/ML (ref 0.45–4.5)
WBC # BLD AUTO: 4.9 X10E3/UL (ref 3.4–10.8)

## 2018-08-29 RX ORDER — FERROUS SULFATE TAB EC 324 MG (65 MG FE EQUIVALENT) 324 (65 FE) MG
TABLET DELAYED RESPONSE ORAL
Qty: 60 TABLET | Refills: 2 | Status: SHIPPED | OUTPATIENT
Start: 2018-08-29 | End: 2018-11-14 | Stop reason: SDUPTHER

## 2018-08-29 RX ORDER — LEVOTHYROXINE SODIUM 0.05 MG/1
50 TABLET ORAL DAILY
Qty: 30 TABLET | Refills: 2 | Status: SHIPPED | OUTPATIENT
Start: 2018-08-29 | End: 2018-11-29 | Stop reason: SDUPTHER

## 2018-08-29 NOTE — TELEPHONE ENCOUNTER
Advised patient as per Dr Kayla Rees  Will order Rx at [facility]   Also, patient states the reason she has not had a colonoscopy is due to what you have to drink and it makes her throw up  Advised her that there are a lot of newer products available where the amount of liquid is minimal   Strongly urged her to get it done  She also stated where she went beforw "yelled" at her  Advised that we can recommend another provider if she would feel more comfortable with that  Patient also requests order for her full labs in October when she returns for these labs     ----- Message from Aakash Andrews MD sent at 8/29/2018  7:23 AM EDT -----  Thyroid a little low/sl anemia---see TW    Reviewed with Dr Kayla Rees -- patient to start Levothyroxine 50mcg Daily  -- if already on thyroid meds to increase dose (no rx documented in med list)    Also to take Fe 324mg  1-2 Daily    Recheck both TSH & CBC will also get Fe/TIBC, Ferritin      To review with patient if she has had a Colonoscopy and if not should consider getting done, as the most likely cause of anemia is due to something in the "gut"

## 2018-10-11 DIAGNOSIS — K21.00 GASTROESOPHAGEAL REFLUX DISEASE WITH ESOPHAGITIS: ICD-10-CM

## 2018-10-11 RX ORDER — ESOMEPRAZOLE MAGNESIUM 40 MG/1
CAPSULE, DELAYED RELEASE ORAL
Qty: 90 CAPSULE | Refills: 1 | Status: SHIPPED | OUTPATIENT
Start: 2018-10-11 | End: 2019-04-09 | Stop reason: SDUPTHER

## 2018-11-04 LAB
ALBUMIN SERPL-MCNC: 4.4 G/DL (ref 3.6–4.8)
ALBUMIN/GLOB SERPL: 1.5 {RATIO} (ref 1.2–2.2)
ALP SERPL-CCNC: 66 IU/L (ref 39–117)
ALT SERPL-CCNC: 22 IU/L (ref 0–32)
AST SERPL-CCNC: 21 IU/L (ref 0–40)
BASOPHILS # BLD AUTO: 0 X10E3/UL (ref 0–0.2)
BASOPHILS NFR BLD AUTO: 1 %
BILIRUB SERPL-MCNC: 0.3 MG/DL (ref 0–1.2)
BUN SERPL-MCNC: 16 MG/DL (ref 8–27)
BUN/CREAT SERPL: 16 (ref 12–28)
CALCIUM SERPL-MCNC: 9.3 MG/DL (ref 8.7–10.3)
CHLORIDE SERPL-SCNC: 105 MMOL/L (ref 96–106)
CHOLEST SERPL-MCNC: 190 MG/DL (ref 100–199)
CO2 SERPL-SCNC: 22 MMOL/L (ref 20–29)
CREAT SERPL-MCNC: 0.97 MG/DL (ref 0.57–1)
EOSINOPHIL # BLD AUTO: 0.2 X10E3/UL (ref 0–0.4)
EOSINOPHIL NFR BLD AUTO: 4 %
ERYTHROCYTE [DISTWIDTH] IN BLOOD BY AUTOMATED COUNT: 16.1 % (ref 12.3–15.4)
FERRITIN SERPL-MCNC: 45 NG/ML (ref 15–150)
GLOBULIN SER-MCNC: 2.9 G/DL (ref 1.5–4.5)
GLUCOSE SERPL-MCNC: 102 MG/DL (ref 65–99)
HBA1C MFR BLD: 5.7 % (ref 4.8–5.6)
HCT VFR BLD AUTO: 38.3 % (ref 34–46.6)
HDLC SERPL-MCNC: 76 MG/DL
HGB BLD-MCNC: 12.8 G/DL (ref 11.1–15.9)
IMM GRANULOCYTES # BLD: 0 X10E3/UL (ref 0–0.1)
IMM GRANULOCYTES NFR BLD: 0 %
IRON SERPL-MCNC: 165 UG/DL (ref 27–139)
LDLC SERPL CALC-MCNC: 101 MG/DL (ref 0–99)
LDLC/HDLC SERPL: 1.3 RATIO (ref 0–3.2)
LYMPHOCYTES # BLD AUTO: 1.2 X10E3/UL (ref 0.7–3.1)
LYMPHOCYTES NFR BLD AUTO: 28 %
MCH RBC QN AUTO: 30.8 PG (ref 26.6–33)
MCHC RBC AUTO-ENTMCNC: 33.4 G/DL (ref 31.5–35.7)
MCV RBC AUTO: 92 FL (ref 79–97)
MONOCYTES # BLD AUTO: 0.4 X10E3/UL (ref 0.1–0.9)
MONOCYTES NFR BLD AUTO: 10 %
NEUTROPHILS # BLD AUTO: 2.5 X10E3/UL (ref 1.4–7)
NEUTROPHILS NFR BLD AUTO: 57 %
PLATELET # BLD AUTO: 279 X10E3/UL (ref 150–379)
POTASSIUM SERPL-SCNC: 4.4 MMOL/L (ref 3.5–5.2)
PROT SERPL-MCNC: 7.3 G/DL (ref 6–8.5)
RBC # BLD AUTO: 4.16 X10E6/UL (ref 3.77–5.28)
SL AMB EGFR AFRICAN AMERICAN: 72 ML/MIN/1.73
SL AMB EGFR NON AFRICAN AMERICAN: 63 ML/MIN/1.73
SL AMB VLDL CHOLESTEROL CALC: 13 MG/DL (ref 5–40)
SODIUM SERPL-SCNC: 140 MMOL/L (ref 134–144)
TRIGL SERPL-MCNC: 67 MG/DL (ref 0–149)
TSH SERPL DL<=0.005 MIU/L-ACNC: 2.21 UIU/ML (ref 0.45–4.5)
WBC # BLD AUTO: 4.3 X10E3/UL (ref 3.4–10.8)

## 2018-11-06 ENCOUNTER — TELEPHONE (OUTPATIENT)
Dept: FAMILY MEDICINE CLINIC | Facility: CLINIC | Age: 63
End: 2018-11-06

## 2018-11-14 ENCOUNTER — OFFICE VISIT (OUTPATIENT)
Dept: FAMILY MEDICINE CLINIC | Facility: CLINIC | Age: 63
End: 2018-11-14
Payer: COMMERCIAL

## 2018-11-14 VITALS
HEIGHT: 61 IN | DIASTOLIC BLOOD PRESSURE: 84 MMHG | BODY MASS INDEX: 26.06 KG/M2 | OXYGEN SATURATION: 98 % | HEART RATE: 96 BPM | WEIGHT: 138 LBS | SYSTOLIC BLOOD PRESSURE: 132 MMHG

## 2018-11-14 DIAGNOSIS — Z12.31 SCREENING MAMMOGRAM, ENCOUNTER FOR: ICD-10-CM

## 2018-11-14 DIAGNOSIS — D64.9 ANEMIA, UNSPECIFIED TYPE: ICD-10-CM

## 2018-11-14 DIAGNOSIS — E78.2 MIXED HYPERLIPIDEMIA: ICD-10-CM

## 2018-11-14 DIAGNOSIS — K21.00 GASTROESOPHAGEAL REFLUX DISEASE WITH ESOPHAGITIS: Primary | ICD-10-CM

## 2018-11-14 DIAGNOSIS — R73.01 IMPAIRED FASTING GLUCOSE: ICD-10-CM

## 2018-11-14 DIAGNOSIS — I10 BENIGN ESSENTIAL HYPERTENSION: ICD-10-CM

## 2018-11-14 DIAGNOSIS — Z00.00 WELLNESS EXAMINATION: ICD-10-CM

## 2018-11-14 PROCEDURE — 3075F SYST BP GE 130 - 139MM HG: CPT | Performed by: FAMILY MEDICINE

## 2018-11-14 PROCEDURE — 1036F TOBACCO NON-USER: CPT | Performed by: FAMILY MEDICINE

## 2018-11-14 PROCEDURE — 99213 OFFICE O/P EST LOW 20 MIN: CPT | Performed by: FAMILY MEDICINE

## 2018-11-14 PROCEDURE — 3079F DIAST BP 80-89 MM HG: CPT | Performed by: FAMILY MEDICINE

## 2018-11-14 PROCEDURE — 99396 PREV VISIT EST AGE 40-64: CPT | Performed by: FAMILY MEDICINE

## 2018-11-14 PROCEDURE — 3008F BODY MASS INDEX DOCD: CPT | Performed by: FAMILY MEDICINE

## 2018-11-14 RX ORDER — FERROUS SULFATE TAB EC 324 MG (65 MG FE EQUIVALENT) 324 (65 FE) MG
TABLET DELAYED RESPONSE ORAL
Qty: 90 TABLET | Refills: 3 | Status: SHIPPED | OUTPATIENT
Start: 2018-11-14 | End: 2019-02-22

## 2018-11-14 RX ORDER — FAMOTIDINE 40 MG/1
40 TABLET, FILM COATED ORAL DAILY
Qty: 90 TABLET | Refills: 3 | Status: SHIPPED | OUTPATIENT
Start: 2018-11-14 | End: 2022-02-23 | Stop reason: SDUPTHER

## 2018-11-14 NOTE — PROGRESS NOTES
HPI:  Tyler Bucio is a 58 y o  female here for her yearly health maintenance exam    Patient Active Problem List   Diagnosis    Allergic rhinitis    Benign essential hypertension    Bunion of great toe of right foot    Esophageal reflux    Impaired fasting glucose    Mixed hyperlipidemia    Osteopenia     Past Medical History:   Diagnosis Date    Disease of thyroid gland     Hypertension     Iron (Fe) deficiency anemia     Osteopenia        1  Advanced Directive: n     2  Durable Power of  for Healthcare: n     3  Social History:           Drug and alcohol History: n                  4  Immunizations up to date: y                 Lifestyle:                           Healthy Diet:y                          Alcohol Use:y                          Tobacco Use:n                          Regular exercise:y                          Weight concerns:n                               5  Over the past 2 weeks, how often have you been bothered by the following:              Little interest or pleasure in doing things:n              Felling down, depressed or hopeless:n       Current Outpatient Prescriptions   Medication Sig Dispense Refill    alendronate (FOSAMAX) 70 mg tablet TAKE 1 TABLET EVERY WEEK 12 tablet 5    esomeprazole (NexIUM) 40 MG capsule TAKE 1 CAPSULE DAILY 90 capsule 1    famotidine (PEPCID) 40 MG tablet Take 1 tablet (40 mg total) by mouth daily 90 tablet 3    ferrous sulfate 324 (65 Fe) mg Take 1 to 2 tablets daily 90 tablet 3    fexofenadine (ALLEGRA) 180 MG tablet Take by mouth      levothyroxine 50 mcg tablet Take 1 tablet (50 mcg total) by mouth daily 30 tablet 2    losartan (COZAAR) 100 MG tablet TAKE 1 TABLET DAILY 90 tablet 1     No current facility-administered medications for this visit        Allergies   Allergen Reactions    Dog Epithelium Allergy Skin Test Cough     Immunization History   Administered Date(s) Administered    Influenza 11/04/2018    Influenza Quadrivalent Preservative Free 3 years and older IM 10/13/2015, 10/31/2017    Influenza TIV (IM) 1955    Pneumococcal Polysaccharide PPV23 10/13/2015    Zoster 03/07/2016       Patient Care Team:  Adrianna Gutierrez MD as PCP - General    Review of Systems   Constitutional: Negative for appetite change, chills, diaphoresis and fever  HENT: Negative for ear pain, rhinorrhea, sinus pressure and sore throat  Eyes: Negative for discharge, redness and itching  Respiratory: Negative for cough, shortness of breath and wheezing  Cardiovascular: Negative for chest pain and palpitations  Gastrointestinal: Negative for abdominal pain, diarrhea, nausea and vomiting  Physical Exam :  Physical Exam   Constitutional: She appears well-developed and well-nourished  No distress  HENT:   Right Ear: Tympanic membrane, external ear and ear canal normal  Tympanic membrane is not injected  Left Ear: Tympanic membrane, external ear and ear canal normal  Tympanic membrane is not injected  Nose: Nose normal    Mouth/Throat: Oropharynx is clear and moist and mucous membranes are normal    Eyes: Pupils are equal, round, and reactive to light  Conjunctivae and EOM are normal  Right eye exhibits no discharge  Left eye exhibits no discharge  Neck: Normal range of motion  Neck supple  No thyromegaly present  Cardiovascular: Normal rate, regular rhythm and normal heart sounds  No murmur heard  Pulmonary/Chest: Effort normal and breath sounds normal  No respiratory distress  She has no wheezes  Lymphadenopathy:     She has no cervical adenopathy  Skin: She is not diaphoretic  Nursing note and vitals reviewed  Assessment and Plan:  1  Gastroesophageal reflux disease with esophagitis  famotidine (PEPCID) 40 MG tablet   2  Anemia, unspecified type  ferrous sulfate 324 (65 Fe) mg   3  Mixed hyperlipidemia     4  Benign essential hypertension     5  Impaired fasting glucose     6   Screening mammogram, encounter for  Mammo screening bilateral w cad   7   Wellness examination         Health Maintenance Due   Topic Date Due    Hepatitis C Screening  1955    Depression Screening PHQ  1955    CRC Screening: Colonoscopy  1955    DTaP,Tdap,and Td Vaccines (1 - Tdap) 11/24/1976    INFLUENZA VACCINE  07/01/2018

## 2018-11-14 NOTE — PROGRESS NOTES
[facility]        NAME: Jose Zhong is a 58 y o  female  : 1955    MRN: [id number]  DATE: 2018  TIME: 5:34 PM    Assessment and Plan   Gastroesophageal reflux disease with esophagitis [K21 0]  1  Gastroesophageal reflux disease with esophagitis  famotidine (PEPCID) 40 MG tablet   2  Anemia, unspecified type  ferrous sulfate 324 (65 Fe) mg   3  Mixed hyperlipidemia     4  Benign essential hypertension     5  Impaired fasting glucose           Patient Instructions     Patient Instructions   Add famotidine for nocturnal cough          Chief Complaint     Chief Complaint   Patient presents with    Physical Exam     mm/labs    Cough     at night          History of Present Illness       F/u anemia/GERD worse      Cough   Pertinent negatives include no chest pain, chills, ear pain, eye redness, fever, rhinorrhea, sore throat, shortness of breath or wheezing  Review of Systems   Review of Systems   Constitutional: Negative for appetite change, chills, diaphoresis and fever  HENT: Negative for ear pain, rhinorrhea, sinus pressure and sore throat  Eyes: Negative for discharge, redness and itching  Respiratory: Positive for cough  Negative for shortness of breath and wheezing  Cardiovascular: Negative for chest pain and palpitations  Gastrointestinal: Negative for abdominal pain, diarrhea, nausea and vomiting           Current Medications       Current Outpatient Prescriptions:     alendronate (FOSAMAX) 70 mg tablet, TAKE 1 TABLET EVERY WEEK, Disp: 12 tablet, Rfl: 5    esomeprazole (NexIUM) 40 MG capsule, TAKE 1 CAPSULE DAILY, Disp: 90 capsule, Rfl: 1    famotidine (PEPCID) 40 MG tablet, Take 1 tablet (40 mg total) by mouth daily, Disp: 90 tablet, Rfl: 3    ferrous sulfate 324 (65 Fe) mg, Take 1 to 2 tablets daily, Disp: 90 tablet, Rfl: 3    fexofenadine (ALLEGRA) 180 MG tablet, Take by mouth, Disp: , Rfl:     levothyroxine 50 mcg tablet, Take 1 tablet (50 mcg total) by mouth daily, Disp: 30 tablet, Rfl: 2    losartan (COZAAR) 100 MG tablet, TAKE 1 TABLET DAILY, Disp: 90 tablet, Rfl: 1    Current Allergies     Allergies as of 11/14/2018 - Reviewed 02/07/2018   Allergen Reaction Noted    Dog epithelium allergy skin test Cough 01/11/2017            The following portions of the patient's history were reviewed and updated as appropriate: allergies, current medications, past family history, past medical history, past social history, past surgical history and problem list      Past Medical History:   Diagnosis Date    Disease of thyroid gland     Hypertension     Iron (Fe) deficiency anemia     Osteopenia        Past Surgical History:   Procedure Laterality Date    APPENDECTOMY      BILATERAL SALPINGOOPHORECTOMY      JOINT REPLACEMENT      KNEE SURGERY      NO PAST SURGERIES         Family History   Problem Relation Age of Onset    Liver cancer Mother     Hypertension Mother     Diabetes Mother     Hypertension Father     Diabetes Paternal Grandfather     Substance Abuse Neg Hx     Mental illness Neg Hx          Medications have been verified  Objective   /84   Pulse 96   Ht 5' 1" (1 549 m)   Wt 62 6 kg (138 lb)   SpO2 98%   BMI 26 07 kg/m²        Physical Exam     Physical Exam   Constitutional: She is oriented to person, place, and time  Vital signs are normal  She appears well-developed and well-nourished  No distress  HENT:   Right Ear: Tympanic membrane, external ear and ear canal normal  Tympanic membrane is not injected  Left Ear: Tympanic membrane, external ear and ear canal normal  Tympanic membrane is not injected  Nose: Nose normal    Mouth/Throat: Oropharynx is clear and moist    Eyes: Pupils are equal, round, and reactive to light  Conjunctivae, EOM and lids are normal    Neck: Normal range of motion  Neck supple  No thyromegaly present  Cardiovascular: Normal rate, regular rhythm and normal heart sounds  No murmur heard  Pulmonary/Chest: Effort normal and breath sounds normal  No respiratory distress  She has no wheezes  Abdominal: Soft  Bowel sounds are normal  There is no splenomegaly or hepatomegaly  There is no tenderness  Musculoskeletal: Normal range of motion  She exhibits no edema, tenderness or deformity  Neurological: She is alert and oriented to person, place, and time  She has normal strength and normal reflexes  She is not disoriented  No sensory deficit  Gait normal    Skin: Skin is warm and dry  No rash noted  She is not diaphoretic  No pallor  Psychiatric: She has a normal mood and affect   Her speech is normal and behavior is normal  Cognition and memory are normal

## 2018-11-25 DIAGNOSIS — I10 ESSENTIAL HYPERTENSION: ICD-10-CM

## 2018-11-26 RX ORDER — LOSARTAN POTASSIUM 100 MG/1
TABLET ORAL
Qty: 90 TABLET | Refills: 1 | Status: SHIPPED | OUTPATIENT
Start: 2018-11-26 | End: 2019-05-25 | Stop reason: SDUPTHER

## 2018-11-29 DIAGNOSIS — E03.9 HYPOTHYROIDISM, UNSPECIFIED TYPE: ICD-10-CM

## 2018-11-30 RX ORDER — LEVOTHYROXINE SODIUM 0.05 MG/1
50 TABLET ORAL DAILY
Qty: 90 TABLET | Refills: 1 | Status: SHIPPED | OUTPATIENT
Start: 2018-11-30 | End: 2019-05-13 | Stop reason: SDUPTHER

## 2018-12-31 ENCOUNTER — OFFICE VISIT (OUTPATIENT)
Dept: FAMILY MEDICINE CLINIC | Facility: CLINIC | Age: 63
End: 2018-12-31
Payer: COMMERCIAL

## 2018-12-31 VITALS
BODY MASS INDEX: 26.43 KG/M2 | WEIGHT: 140 LBS | HEIGHT: 61 IN | SYSTOLIC BLOOD PRESSURE: 118 MMHG | DIASTOLIC BLOOD PRESSURE: 70 MMHG

## 2018-12-31 DIAGNOSIS — K21.00 GASTROESOPHAGEAL REFLUX DISEASE WITH ESOPHAGITIS: Primary | ICD-10-CM

## 2018-12-31 DIAGNOSIS — R07.89 ATYPICAL CHEST PAIN: ICD-10-CM

## 2018-12-31 DIAGNOSIS — R10.11 RUQ ABDOMINAL PAIN: ICD-10-CM

## 2018-12-31 PROCEDURE — 99214 OFFICE O/P EST MOD 30 MIN: CPT | Performed by: FAMILY MEDICINE

## 2018-12-31 PROCEDURE — 3008F BODY MASS INDEX DOCD: CPT | Performed by: FAMILY MEDICINE

## 2018-12-31 PROCEDURE — 1036F TOBACCO NON-USER: CPT | Performed by: FAMILY MEDICINE

## 2018-12-31 NOTE — PROGRESS NOTES
[facility]        NAME: Niall Lemos is a 61 y o  female  : 1955    MRN: [id number]  DATE: 2018  TIME: 8:23 AM    Assessment and Plan   Gastroesophageal reflux disease with esophagitis [K21 0]  1  Gastroesophageal reflux disease with esophagitis     2  Atypical chest pain     3  RUQ abdominal pain           Patient Instructions     Patient Instructions   U/S RUQ          Chief Complaint     Chief Complaint   Patient presents with    Follow-up     Follow Up 18- Indigestion worse         History of Present Illness       C/o mid epigastric pain--intermittent--not assoc w/ exercise--forgets to take pepcid HS--pain mod severity        Review of Systems   Review of Systems   Constitutional: Negative for fatigue, fever and unexpected weight change  HENT: Negative for congestion, sinus pain and sore throat  Eyes: Negative for visual disturbance  Respiratory: Negative for shortness of breath and wheezing  Cardiovascular: Negative for chest pain and palpitations  Gastrointestinal: Positive for abdominal pain  Negative for nausea and vomiting  Musculoskeletal: Negative  Negative for arthralgias and myalgias  Neurological: Negative for syncope, weakness and numbness  Psychiatric/Behavioral: Negative  Negative for confusion, dysphoric mood and suicidal ideas           Current Medications       Current Outpatient Prescriptions:     alendronate (FOSAMAX) 70 mg tablet, TAKE 1 TABLET EVERY WEEK, Disp: 12 tablet, Rfl: 5    esomeprazole (NexIUM) 40 MG capsule, TAKE 1 CAPSULE DAILY, Disp: 90 capsule, Rfl: 1    famotidine (PEPCID) 40 MG tablet, Take 1 tablet (40 mg total) by mouth daily, Disp: 90 tablet, Rfl: 3    ferrous sulfate 324 (65 Fe) mg, Take 1 to 2 tablets daily, Disp: 90 tablet, Rfl: 3    fexofenadine (ALLEGRA) 180 MG tablet, Take by mouth, Disp: , Rfl:     levothyroxine 50 mcg tablet, Take 1 tablet (50 mcg total) by mouth daily, Disp: 90 tablet, Rfl: 1    losartan (COZAAR) 100 MG tablet, TAKE 1 TABLET DAILY, Disp: 90 tablet, Rfl: 1    Current Allergies     Allergies as of 12/31/2018 - Reviewed 12/31/2018   Allergen Reaction Noted    Dog epithelium allergy skin test Cough 01/11/2017            The following portions of the patient's history were reviewed and updated as appropriate: allergies, current medications, past family history, past medical history, past social history, past surgical history and problem list      Past Medical History:   Diagnosis Date    Disease of thyroid gland     Hypertension     Iron (Fe) deficiency anemia     Osteopenia        Past Surgical History:   Procedure Laterality Date    APPENDECTOMY      BILATERAL SALPINGOOPHORECTOMY      JOINT REPLACEMENT      KNEE SURGERY      NO PAST SURGERIES         Family History   Problem Relation Age of Onset    Liver cancer Mother     Hypertension Mother     Diabetes Mother     Hypertension Father     Diabetes Paternal Grandfather     Substance Abuse Neg Hx     Mental illness Neg Hx          Medications have been verified  Objective   /70   Ht 5' 0 5" (1 537 m)   Wt 63 5 kg (140 lb)   BMI 26 89 kg/m²        Physical Exam     Physical Exam   Constitutional: She appears well-developed and well-nourished  No distress  HENT:   Right Ear: Tympanic membrane, external ear and ear canal normal  Tympanic membrane is not injected  Left Ear: Tympanic membrane, external ear and ear canal normal  Tympanic membrane is not injected  Nose: Nose normal    Mouth/Throat: Oropharynx is clear and moist and mucous membranes are normal    Eyes: Pupils are equal, round, and reactive to light  Conjunctivae and EOM are normal  Right eye exhibits no discharge  Left eye exhibits no discharge  Neck: Normal range of motion  Neck supple  No thyromegaly present  Cardiovascular: Normal rate, regular rhythm and normal heart sounds  No murmur heard    Pulmonary/Chest: Effort normal and breath sounds normal  No respiratory distress  She has no wheezes  Abdominal:   Tender midepigastric   Lymphadenopathy:     She has no cervical adenopathy  Skin: She is not diaphoretic  Nursing note and vitals reviewed

## 2019-01-07 ENCOUNTER — HOSPITAL ENCOUNTER (OUTPATIENT)
Dept: ULTRASOUND IMAGING | Facility: HOSPITAL | Age: 64
Discharge: HOME | End: 2019-01-07
Payer: COMMERCIAL

## 2019-01-07 ENCOUNTER — TELEPHONE (OUTPATIENT)
Dept: FAMILY MEDICINE CLINIC | Facility: CLINIC | Age: 64
End: 2019-01-07

## 2019-01-07 DIAGNOSIS — R10.11 RUQ ABDOMINAL PAIN: ICD-10-CM

## 2019-01-07 DIAGNOSIS — N13.30 HYDRONEPHROSIS DETERMINED BY ULTRASOUND: Primary | ICD-10-CM

## 2019-01-07 PROCEDURE — 76705 ECHO EXAM OF ABDOMEN: CPT

## 2019-01-07 NOTE — TELEPHONE ENCOUNTER
ORDERED AND INS AUTHORIZED- MSG LEFT FOR PT    ----- Message from Alex Wilks MD sent at 1/7/2019 10:34 AM EST -----  Hydronephrosis--R--needs renal CT---needs PA---Sonia aware

## 2019-01-09 ENCOUNTER — HOSPITAL ENCOUNTER (OUTPATIENT)
Dept: CT IMAGING | Facility: HOSPITAL | Age: 64
Discharge: HOME | End: 2019-01-09
Payer: COMMERCIAL

## 2019-01-09 DIAGNOSIS — N13.30 HYDRONEPHROSIS DETERMINED BY ULTRASOUND: ICD-10-CM

## 2019-01-09 PROCEDURE — 74176 CT ABD & PELVIS W/O CONTRAST: CPT

## 2019-01-11 ENCOUNTER — TELEPHONE (OUTPATIENT)
Dept: FAMILY MEDICINE CLINIC | Facility: CLINIC | Age: 64
End: 2019-01-11

## 2019-01-13 ENCOUNTER — HOSPITAL ENCOUNTER (EMERGENCY)
Facility: HOSPITAL | Age: 64
Discharge: HOME | End: 2019-01-13
Attending: EMERGENCY MEDICINE | Admitting: EMERGENCY MEDICINE
Payer: COMMERCIAL

## 2019-01-13 ENCOUNTER — APPOINTMENT (EMERGENCY)
Dept: CT IMAGING | Facility: HOSPITAL | Age: 64
End: 2019-01-13
Payer: COMMERCIAL

## 2019-01-13 VITALS
DIASTOLIC BLOOD PRESSURE: 88 MMHG | HEART RATE: 80 BPM | SYSTOLIC BLOOD PRESSURE: 158 MMHG | BODY MASS INDEX: 27.09 KG/M2 | OXYGEN SATURATION: 98 % | RESPIRATION RATE: 18 BRPM | TEMPERATURE: 98.6 F | WEIGHT: 138 LBS | HEIGHT: 60 IN

## 2019-01-13 DIAGNOSIS — K52.9 COLITIS: Primary | ICD-10-CM

## 2019-01-13 DIAGNOSIS — N13.30 HYDRONEPHROSIS: ICD-10-CM

## 2019-01-13 LAB
ALBUMIN SERPL BCP-MCNC: 3.8 G/DL (ref 3.5–5)
ALP SERPL-CCNC: 76 U/L (ref 46–116)
ALT SERPL W P-5'-P-CCNC: 32 U/L (ref 12–78)
ANION GAP SERPL CALCULATED.3IONS-SCNC: 9 MMOL/L (ref 4–13)
APTT PPP: 23 SECONDS (ref 26–38)
AST SERPL W P-5'-P-CCNC: 18 U/L (ref 5–45)
BASOPHILS # BLD AUTO: 0.05 THOUSANDS/ΜL (ref 0–0.1)
BASOPHILS NFR BLD AUTO: 1 % (ref 0–1)
BILIRUB SERPL-MCNC: 0.3 MG/DL (ref 0.2–1)
BILIRUB UR QL STRIP: NEGATIVE
BUN SERPL-MCNC: 11 MG/DL (ref 5–25)
CALCIUM SERPL-MCNC: 8.8 MG/DL (ref 8.3–10.1)
CHLORIDE SERPL-SCNC: 103 MMOL/L (ref 100–108)
CLARITY UR: CLEAR
CO2 SERPL-SCNC: 25 MMOL/L (ref 21–32)
COLOR UR: YELLOW
CREAT SERPL-MCNC: 1.01 MG/DL (ref 0.6–1.3)
EOSINOPHIL # BLD AUTO: 0.1 THOUSAND/ΜL (ref 0–0.61)
EOSINOPHIL NFR BLD AUTO: 1 % (ref 0–6)
ERYTHROCYTE [DISTWIDTH] IN BLOOD BY AUTOMATED COUNT: 13.4 % (ref 11.6–15.1)
GFR SERPL CREATININE-BSD FRML MDRD: 59 ML/MIN/1.73SQ M
GLUCOSE SERPL-MCNC: 97 MG/DL (ref 65–140)
GLUCOSE UR STRIP-MCNC: NEGATIVE MG/DL
HCT VFR BLD AUTO: 37.9 % (ref 34.8–46.1)
HGB BLD-MCNC: 12.7 G/DL (ref 11.5–15.4)
HGB UR QL STRIP.AUTO: NEGATIVE
IMM GRANULOCYTES # BLD AUTO: 0.02 THOUSAND/UL (ref 0–0.2)
IMM GRANULOCYTES NFR BLD AUTO: 0 % (ref 0–2)
INR PPP: 1.02 (ref 0.86–1.17)
KETONES UR STRIP-MCNC: NEGATIVE MG/DL
LACTATE SERPL-SCNC: 1.1 MMOL/L (ref 0.5–2)
LEUKOCYTE ESTERASE UR QL STRIP: NEGATIVE
LYMPHOCYTES # BLD AUTO: 1.45 THOUSANDS/ΜL (ref 0.6–4.47)
LYMPHOCYTES NFR BLD AUTO: 21 % (ref 14–44)
MCH RBC QN AUTO: 31.4 PG (ref 26.8–34.3)
MCHC RBC AUTO-ENTMCNC: 33.5 G/DL (ref 31.4–37.4)
MCV RBC AUTO: 94 FL (ref 82–98)
MONOCYTES # BLD AUTO: 0.57 THOUSAND/ΜL (ref 0.17–1.22)
MONOCYTES NFR BLD AUTO: 8 % (ref 4–12)
NEUTROPHILS # BLD AUTO: 4.85 THOUSANDS/ΜL (ref 1.85–7.62)
NEUTS SEG NFR BLD AUTO: 69 % (ref 43–75)
NITRITE UR QL STRIP: NEGATIVE
NRBC BLD AUTO-RTO: 0 /100 WBCS
PH UR STRIP.AUTO: 6.5 [PH] (ref 4.5–8)
PLATELET # BLD AUTO: 266 THOUSANDS/UL (ref 149–390)
PMV BLD AUTO: 9.1 FL (ref 8.9–12.7)
POTASSIUM SERPL-SCNC: 3.6 MMOL/L (ref 3.5–5.3)
PROT SERPL-MCNC: 7.9 G/DL (ref 6.4–8.2)
PROT UR STRIP-MCNC: NEGATIVE MG/DL
PROTHROMBIN TIME: 12.8 SECONDS (ref 11.8–14.2)
RBC # BLD AUTO: 4.05 MILLION/UL (ref 3.81–5.12)
SODIUM SERPL-SCNC: 137 MMOL/L (ref 136–145)
SP GR UR STRIP.AUTO: <=1.005 (ref 1–1.03)
UROBILINOGEN UR QL STRIP.AUTO: 0.2 E.U./DL
WBC # BLD AUTO: 7.04 THOUSAND/UL (ref 4.31–10.16)

## 2019-01-13 PROCEDURE — 96374 THER/PROPH/DIAG INJ IV PUSH: CPT

## 2019-01-13 PROCEDURE — 36415 COLL VENOUS BLD VENIPUNCTURE: CPT | Performed by: EMERGENCY MEDICINE

## 2019-01-13 PROCEDURE — 85730 THROMBOPLASTIN TIME PARTIAL: CPT | Performed by: EMERGENCY MEDICINE

## 2019-01-13 PROCEDURE — 80053 COMPREHEN METABOLIC PANEL: CPT | Performed by: EMERGENCY MEDICINE

## 2019-01-13 PROCEDURE — 85025 COMPLETE CBC W/AUTO DIFF WBC: CPT | Performed by: EMERGENCY MEDICINE

## 2019-01-13 PROCEDURE — 74176 CT ABD & PELVIS W/O CONTRAST: CPT

## 2019-01-13 PROCEDURE — 82272 OCCULT BLD FECES 1-3 TESTS: CPT

## 2019-01-13 PROCEDURE — 81003 URINALYSIS AUTO W/O SCOPE: CPT | Performed by: EMERGENCY MEDICINE

## 2019-01-13 PROCEDURE — 96375 TX/PRO/DX INJ NEW DRUG ADDON: CPT

## 2019-01-13 PROCEDURE — 83605 ASSAY OF LACTIC ACID: CPT | Performed by: EMERGENCY MEDICINE

## 2019-01-13 PROCEDURE — 85610 PROTHROMBIN TIME: CPT | Performed by: EMERGENCY MEDICINE

## 2019-01-13 PROCEDURE — 99284 EMERGENCY DEPT VISIT MOD MDM: CPT

## 2019-01-13 PROCEDURE — 96361 HYDRATE IV INFUSION ADD-ON: CPT

## 2019-01-13 RX ORDER — ONDANSETRON 2 MG/ML
4 INJECTION INTRAMUSCULAR; INTRAVENOUS ONCE
Status: COMPLETED | OUTPATIENT
Start: 2019-01-13 | End: 2019-01-13

## 2019-01-13 RX ORDER — CIPROFLOXACIN 500 MG/1
500 TABLET, FILM COATED ORAL 2 TIMES DAILY
Qty: 14 TABLET | Refills: 0 | Status: SHIPPED | OUTPATIENT
Start: 2019-01-13 | End: 2019-01-20

## 2019-01-13 RX ORDER — METRONIDAZOLE 500 MG/1
500 TABLET ORAL ONCE
Status: COMPLETED | OUTPATIENT
Start: 2019-01-13 | End: 2019-01-13

## 2019-01-13 RX ORDER — OXYCODONE HYDROCHLORIDE AND ACETAMINOPHEN 5; 325 MG/1; MG/1
1 TABLET ORAL EVERY 8 HOURS PRN
Qty: 15 TABLET | Refills: 0 | Status: SHIPPED | OUTPATIENT
Start: 2019-01-13 | End: 2019-01-23

## 2019-01-13 RX ORDER — KETOROLAC TROMETHAMINE 30 MG/ML
30 INJECTION, SOLUTION INTRAMUSCULAR; INTRAVENOUS ONCE
Status: COMPLETED | OUTPATIENT
Start: 2019-01-13 | End: 2019-01-13

## 2019-01-13 RX ORDER — CIPROFLOXACIN 500 MG/1
500 TABLET, FILM COATED ORAL ONCE
Status: COMPLETED | OUTPATIENT
Start: 2019-01-13 | End: 2019-01-13

## 2019-01-13 RX ORDER — METRONIDAZOLE 500 MG/1
500 TABLET ORAL 3 TIMES DAILY
Qty: 21 TABLET | Refills: 0 | Status: SHIPPED | OUTPATIENT
Start: 2019-01-13 | End: 2019-01-20

## 2019-01-13 RX ORDER — SODIUM CHLORIDE 9 MG/ML
125 INJECTION, SOLUTION INTRAVENOUS CONTINUOUS
Status: DISCONTINUED | OUTPATIENT
Start: 2019-01-13 | End: 2019-01-13 | Stop reason: HOSPADM

## 2019-01-13 RX ADMIN — ONDANSETRON 4 MG: 2 INJECTION, SOLUTION INTRAMUSCULAR; INTRAVENOUS at 09:08

## 2019-01-13 RX ADMIN — SODIUM CHLORIDE 125 ML/HR: 0.9 INJECTION, SOLUTION INTRAVENOUS at 09:07

## 2019-01-13 RX ADMIN — METRONIDAZOLE 500 MG: 500 TABLET ORAL at 10:25

## 2019-01-13 RX ADMIN — CIPROFLOXACIN HYDROCHLORIDE 500 MG: 500 TABLET, FILM COATED ORAL at 10:25

## 2019-01-13 RX ADMIN — KETOROLAC TROMETHAMINE 30 MG: 30 INJECTION, SOLUTION INTRAMUSCULAR; INTRAVENOUS at 09:08

## 2019-01-13 NOTE — ED PROVIDER NOTES
History  Chief Complaint   Patient presents with    Abdominal Pain     pt states that yesterday she developed abdominal pain  This morning pt stated she developed loose stool with "clots"  This is a 60-year-old female who presents with right sided abdominal suprapubic and flank pain increasing since yesterday she has had some intermittent right upper quadrant pain over the past several months seen by her family doctor and had outpatient CT scan which showed right-sided hydronephrosis with possible stricture she has some nausea no fevers or chills  She had some watery diarrhea last evening and then started passing small blood clots per rectum this morning  No recent antibiotics  She was instructed to follow up with Urology as an outpatient for possible stent placement  History provided by:  Patient  Abdominal Pain   Pain location:  Suprapubic, RLQ and R flank  Pain quality: aching    Pain severity:  Severe  Onset quality:  Unable to specify  Timing:  Constant  Progression:  Waxing and waning  Chronicity:  Recurrent  Relieved by:  Nothing  Worsened by:  Palpation and movement  Associated symptoms: diarrhea and nausea    Associated symptoms: no fever and no vomiting        Prior to Admission Medications   Prescriptions Last Dose Informant Patient Reported? Taking?    alendronate (FOSAMAX) 70 mg tablet   No No   Sig: TAKE 1 TABLET EVERY WEEK   esomeprazole (NexIUM) 40 MG capsule   No No   Sig: TAKE 1 CAPSULE DAILY   famotidine (PEPCID) 40 MG tablet   No No   Sig: Take 1 tablet (40 mg total) by mouth daily   ferrous sulfate 324 (65 Fe) mg   No No   Sig: Take 1 to 2 tablets daily   fexofenadine (ALLEGRA) 180 MG tablet   Yes No   Sig: Take by mouth   levothyroxine 50 mcg tablet   No No   Sig: Take 1 tablet (50 mcg total) by mouth daily   losartan (COZAAR) 100 MG tablet   No No   Sig: TAKE 1 TABLET DAILY      Facility-Administered Medications: None       Past Medical History:   Diagnosis Date    Disease of thyroid gland     Hyperlipidemia     Hypertension     Iron (Fe) deficiency anemia     Osteopenia     Renal disorder        Past Surgical History:   Procedure Laterality Date    APPENDECTOMY      BILATERAL SALPINGOOPHORECTOMY      JOINT REPLACEMENT      KNEE SURGERY      NO PAST SURGERIES         Family History   Problem Relation Age of Onset    Liver cancer Mother     Hypertension Mother     Diabetes Mother     Hypertension Father     Diabetes Paternal Grandfather     Substance Abuse Neg Hx     Mental illness Neg Hx      I have reviewed and agree with the history as documented  Social History   Substance Use Topics    Smoking status: Former Smoker     Types: Cigarettes     Quit date: 2012    Smokeless tobacco: Never Used      Comment: quit 11/1/2013    Alcohol use Yes      Comment: social        Review of Systems   Constitutional: Negative for fever  Gastrointestinal: Positive for abdominal pain, blood in stool, diarrhea and nausea  Negative for vomiting  Musculoskeletal: Positive for back pain  All other systems reviewed and are negative  Physical Exam  Physical Exam   Constitutional: She is oriented to person, place, and time  She appears well-developed and well-nourished  She appears distressed  HENT:   Head: Normocephalic and atraumatic  Right Ear: External ear normal    Left Ear: External ear normal    Mouth/Throat: Oropharyngeal exudate present  Eyes: Pupils are equal, round, and reactive to light  EOM are normal  Right eye exhibits no discharge  Left eye exhibits no discharge  No scleral icterus  Neck: Neck supple  No tracheal deviation present  Cardiovascular: Normal rate, regular rhythm and intact distal pulses  Exam reveals no gallop and no friction rub  No murmur heard  Pulmonary/Chest: Effort normal and breath sounds normal  No stridor  No respiratory distress  She has no wheezes  She has no rales  Abdominal: Soft   Bowel sounds are normal  She exhibits no distension and no mass  There is tenderness (Generalized)  There is guarding ( voluntary)  There is no rebound  Genitourinary: Rectal exam shows guaiac positive stool  Musculoskeletal: Normal range of motion  She exhibits no edema, tenderness or deformity  Neurological: She is alert and oriented to person, place, and time  No cranial nerve deficit or sensory deficit  She exhibits normal muscle tone  Coordination normal    Skin: Skin is warm and dry  No rash noted  She is not diaphoretic  Psychiatric: She has a normal mood and affect  Her behavior is normal  Thought content normal    Nursing note and vitals reviewed        Vital Signs  ED Triage Vitals [01/13/19 0828]   Temperature Pulse Respirations Blood Pressure SpO2   98 6 °F (37 °C) 89 20 166/92 98 %      Temp Source Heart Rate Source Patient Position - Orthostatic VS BP Location FiO2 (%)   Temporal Monitor -- -- --      Pain Score       4           Vitals:    01/13/19 0828   BP: 166/92   Pulse: 89       Visual Acuity      ED Medications  Medications   sodium chloride 0 9 % infusion (0 mL/hr Intravenous Stopped 1/13/19 1022)   ketorolac (TORADOL) injection 30 mg (30 mg Intravenous Given 1/13/19 0908)   ondansetron (ZOFRAN) injection 4 mg (4 mg Intravenous Given 1/13/19 0908)   metroNIDAZOLE (FLAGYL) tablet 500 mg (500 mg Oral Given 1/13/19 1025)   ciprofloxacin (CIPRO) tablet 500 mg (500 mg Oral Given 1/13/19 1025)       Diagnostic Studies  Results Reviewed     Procedure Component Value Units Date/Time    UA w Reflex to Microscopic w Reflex to Culture [[id number]] Collected:  01/13/19 0901    Lab Status:  Final result Specimen:  Urine from Urine, Clean Catch Updated:  01/13/19 0944     Color, UA Yellow     Clarity, UA Clear     Specific Gravity, UA <=1 005     pH, UA 6 5     Leukocytes, UA Negative     Nitrite, UA Negative     Protein, UA Negative mg/dl      Glucose, UA Negative mg/dl      Ketones, UA Negative mg/dl      Urobilinogen, UA 0 2 E U /dl Bilirubin, UA Negative     Blood, UA Negative    Lactic acid, plasma [[id number]]  (Normal) Collected:  01/13/19 0907    Lab Status:  Final result Specimen:  Blood from Arm, Right Updated:  01/13/19 0944     LACTIC ACID 1 1 mmol/L     Narrative:         Result may be elevated if tourniquet was used during collection  Comprehensive metabolic panel [[id number]] Collected:  01/13/19 8008    Lab Status:  Final result Specimen:  Blood from Arm, Right Updated:  01/13/19 0941     Sodium 137 mmol/L      Potassium 3 6 mmol/L      Chloride 103 mmol/L      CO2 25 mmol/L      ANION GAP 9 mmol/L      BUN 11 mg/dL      Creatinine 1 01 mg/dL      Glucose 97 mg/dL      Calcium 8 8 mg/dL      AST 18 U/L      ALT 32 U/L      Alkaline Phosphatase 76 U/L      Total Protein 7 9 g/dL      Albumin 3 8 g/dL      Total Bilirubin 0 30 mg/dL      eGFR 59 ml/min/1 73sq m     Narrative:         National Kidney Disease Education Program recommendations are as follows:  GFR calculation is accurate only with a steady state creatinine  Chronic Kidney disease less than 60 ml/min/1 73 sq  meters  Kidney failure less than 15 ml/min/1 73 sq  meters      Protime-INR [[id number]]  (Normal) Collected:  01/13/19 0907    Lab Status:  Final result Specimen:  Blood from Arm, Right Updated:  01/13/19 0935     Protime 12 8 seconds      INR 1 02    APTT [[id number]]  (Abnormal) Collected:  01/13/19 0907    Lab Status:  Final result Specimen:  Blood from Arm, Right Updated:  01/13/19 0935     PTT 23 (L) seconds     Narrative:       No clots    CBC and differential [[id number]] Collected:  01/13/19 0907    Lab Status:  Final result Specimen:  Blood from Arm, Right Updated:  01/13/19 0918     WBC 7 04 Thousand/uL      RBC 4 05 Million/uL      Hemoglobin 12 7 g/dL      Hematocrit 37 9 %      MCV 94 fL      MCH 31 4 pg      MCHC 33 5 g/dL      RDW 13 4 %      MPV 9 1 fL      Platelets 877 Thousands/uL      nRBC 0 /100 WBCs      Neutrophils Relative 69 %      Immat GRANS % 0 %      Lymphocytes Relative 21 %      Monocytes Relative 8 %      Eosinophils Relative 1 %      Basophils Relative 1 %      Neutrophils Absolute 4 85 Thousands/µL      Immature Grans Absolute 0 02 Thousand/uL      Lymphocytes Absolute 1 45 Thousands/µL      Monocytes Absolute 0 57 Thousand/µL      Eosinophils Absolute 0 10 Thousand/µL      Basophils Absolute 0 05 Thousands/µL     Stool Enteric Bacterial Panel by PCR [[id number]]     Lab Status:  No result Specimen:  Stool from Rectum                  CT abdomen pelvis wo contrast   Final Result by Sara Randle DO (01/13 2064)   1  Abnormal appearance of the descending colon and proximal sigmoid colon, most compatible with colitis, either infectious or inflammatory  Follow-up colonoscopy or repeat CT scan with oral and intravenous contrast material, following a course of antibiotic therapy, is recommended, given that inflammatory colonic neoplasm can have a similar appearance  The rapid development of CT findings    and symptoms makes this less likely  2   Colonic diverticulosis  3   Chronic distal right ureteric obstruction  The study was marked in [facility] for immediate notification  Workstation performed: [id number]                    Procedures  Procedures       Phone Contacts  ED Phone Contact    ED Course                               MDM  Number of Diagnoses or Management Options  Diagnosis management comments: Abdominal pain may be secondary to her known hydronephrosis versus colonic issue with rectal bleeding etiology such is colonic mass AVM diverticulosis hemorrhoid infection or ischemia    Will check labs and recheck CT scan for further workup       Amount and/or Complexity of Data Reviewed  Clinical lab tests: ordered  Tests in the radiology section of CPT®: ordered      CritCare Time    Disposition  Final diagnoses:   Colitis   Hydronephrosis - Right-sided chronic     Time reflects when diagnosis was documented in both MDM as applicable and the Disposition within this note     Time User Action Codes Description Comment    1/13/2019 10:21 AM Ameena Patricio Add [K52 9] Colitis     1/13/2019 10:21 AM Ameena Patricio Add [N13 30] Hydronephrosis     1/13/2019 10:22 AM Ameena Patricio Modify [N13 30] Hydronephrosis Right-sided chronic      ED Disposition     ED Disposition Condition Comment    Discharge  Trav Segovia discharge to home/self care  Condition at discharge: Stable        Follow-up Information     Follow up With Specialties Details Why Contact Info    Jossue De Leon MD Urology In 1 week Right-sided hydronephrosis [place]  [address] [facility]  [id number] [place] [address] Drive [address]      Linda Andrade MD Gastroenterology In 1 week Outpatient colonoscopy after completion of antibiotics to rule out colonic lesions/tumor [address]      Corene Kussmaul, MD Family Medicine In 1 week  [address]  [address] [address]  [address] [phone]            Patient's Medications   Discharge Prescriptions    CIPROFLOXACIN (CIPRO) 500 MG TABLET    Take 1 tablet (500 mg total) by mouth 2 (two) times a day for 7 days       Start Date: 1/13/2019 End Date: 1/20/2019       Order Dose: 500 mg       Quantity: 14 tablet    Refills: 0    METRONIDAZOLE (FLAGYL) 500 MG TABLET    Take 1 tablet (500 mg total) by mouth 3 (three) times a day for 7 days       Start Date: 1/13/2019 End Date: 1/20/2019       Order Dose: 500 mg       Quantity: 21 tablet    Refills: 0    OXYCODONE-ACETAMINOPHEN (PERCOCET) 5-325 MG PER TABLET    Take 1 tablet by mouth every 8 (eight) hours as needed for moderate pain for up to 10 days Max Daily Amount: 3 tablets       Start Date: 1/13/2019 End Date: 1/23/2019       Order Dose: 1 tablet       Quantity: 15 tablet    Refills: 0     No discharge procedures on file      ED Provider  Electronically Signed by           Isis Donald DO  01/13/19 0777

## 2019-01-13 NOTE — DISCHARGE INSTRUCTIONS
Colitis   WHAT YOU NEED TO KNOW:   Colitis is swelling and irritation of your colon  Colitis may be caused by ulcers or a problem with your immune system  Bacteria, a virus, or a parasite may also cause colitis  The cause may not be known  You may have diarrhea, abdominal pain, fever, or blood or mucus in your bowel movement  DISCHARGE INSTRUCTIONS:   Return to the emergency department if:   · You have sudden trouble breathing  · Your bowel movements are black or have blood in them  · You have blood in your vomit  · You have severe abdominal pain or your abdomen is swollen and feels hard  · You have any of the following signs of dehydration:     ¨ Dizziness or weakness    ¨ Dry mouth, cracked lips, or severe thirst    ¨ Fast heartbeat or breathing    ¨ Urinating very little or not at all  Contact your healthcare provider if:   · Your symptoms get worse or do not go away  · You have a fever, chills, cough, or feel weak and achy  · You suddenly lose weight without trying  · You have questions or concerns about your condition or care  Medicines:   · Medicines  may be given to decrease inflammation in your colon and treat diarrhea  · Take your medicine as directed  Contact your healthcare provider if you think your medicine is not helping or if you have side effects  Tell him of her if you are allergic to any medicine  Keep a list of the medicines, vitamins, and herbs you take  Include the amounts, and when and why you take them  Bring the list or the pill bottles to follow-up visits  Carry your medicine list with you in case of an emergency  Manage your symptoms:   · Drink liquids as directed  to help prevent dehydration  Good liquids to drink include water, juice, and broth  Ask how much liquid to drink each day  You may need to drink an oral rehydration solution (ORS)  An ORS contains a balance of water, salt, and sugar to replace body fluids lost during diarrhea       · Eat a variety of healthy foods  Healthy foods include fruits, vegetables, whole-grain breads, beans, low-fat dairy products, lean meats, and fish  You may need to eat several small meals throughout the day instead of large meals  Avoid spicy foods, caffeine, chocolate, and foods high in fat  · Talk to your healthcare provider before you take NSAIDs  NSAIDs can cause worsen your symptoms if ulcers are causing your colitis  · Start to exercise when you feel better  Regular exercise helps your bowels work normally  Ask about the best exercise plan for you  Follow up with your healthcare provider as directed: You may need to return for a colonoscopy or other tests  Write down how often you have a bowel movements and what they look like  Bring this to your follow-up visits  Write down your questions so you remember to ask them during your visits  © 2017 [address] [employer] [employer] Information is for End User's use only and may not be sold, redistributed or otherwise used for commercial purposes  All illustrations and images included in CareNotes® are the copyrighted property of [employer]  or [employer]  The above information is an  only  It is not intended as medical advice for individual conditions or treatments  Talk to your doctor, nurse or pharmacist before following any medical regimen to see if it is safe and effective for you  Hydronephrosis   WHAT YOU NEED TO KNOW:   Hydronephrosis is swelling in one or both kidneys caused by urine buildup  Urine normally flows from the kidneys to the bladder through tubes called ureters  A blockage in the ureters can prevent urine from flowing properly  Urine flow may also be prevented or slowed if your kidneys do not work correctly  Urine flows back into your urinary tract  Pressure builds up in the kidney and causes swelling  DISCHARGE INSTRUCTIONS:   Medicines:   You may  need the following:  · Antibiotics  fight or prevent an infection caused by bacteria  · Take your medicine as directed  Contact your healthcare provider if you think your medicine is not helping or if you have side effects  Tell him or her if you are allergic to any medicine  Keep a list of the medicines, vitamins, and herbs you take  Include the amounts, and when and why you take them  Bring the list or the pill bottles to follow-up visits  Carry your medicine list with you in case of an emergency  Follow up with your urologist, oncologist, or gynecologist as directed:  Write down your questions so you remember to ask them during your visits  Contact your healthcare provider if:   · Your abdomen feels full  · You have a change in how much or how often you urinate  · You urinate more times at night and in larger amounts than during the day  · You have mild lower back pain or pain on one side when you urinate  · You have questions or concerns about your condition or care  Seek care immediately if:   · You have severe, stabbing back pain  · You have blood in your urine  · You cannot urinate, or you urinate very little  © 2017 [address] Information is for End User's use only and may not be sold, redistributed or otherwise used for commercial purposes  All illustrations and images included in CareNotes® are the copyrighted property of [employer]  or [employer]  The above information is an  only  It is not intended as medical advice for individual conditions or treatments  Talk to your doctor, nurse or pharmacist before following any medical regimen to see if it is safe and effective for you

## 2019-01-14 ENCOUNTER — TELEPHONE (OUTPATIENT)
Dept: UROLOGY | Facility: MEDICAL CENTER | Age: 64
End: 2019-01-14

## 2019-01-14 ENCOUNTER — OFFICE VISIT (OUTPATIENT)
Dept: UROLOGY | Facility: HOSPITAL | Age: 64
End: 2019-01-14
Payer: COMMERCIAL

## 2019-01-14 VITALS
BODY MASS INDEX: 26.81 KG/M2 | HEART RATE: 66 BPM | DIASTOLIC BLOOD PRESSURE: 76 MMHG | SYSTOLIC BLOOD PRESSURE: 128 MMHG | HEIGHT: 61 IN | WEIGHT: 142 LBS

## 2019-01-14 DIAGNOSIS — N13.30 HYDRONEPHROSIS, UNSPECIFIED HYDRONEPHROSIS TYPE: Primary | ICD-10-CM

## 2019-01-14 LAB
SL AMB  POCT GLUCOSE, UA: NORMAL
SL AMB LEUKOCYTE ESTERASE,UA: NORMAL
SL AMB POCT BILIRUBIN,UA: NORMAL
SL AMB POCT BLOOD,UA: NORMAL
SL AMB POCT CLARITY,UA: NORMAL
SL AMB POCT COLOR,UA: YELLOW
SL AMB POCT KETONES,UA: NORMAL
SL AMB POCT NITRITE,UA: NORMAL
SL AMB POCT PH,UA: 6
SL AMB POCT SPECIFIC GRAVITY,UA: 1.02
SL AMB POCT URINE PROTEIN: NORMAL
SL AMB POCT UROBILINOGEN: NORMAL

## 2019-01-14 PROCEDURE — 81002 URINALYSIS NONAUTO W/O SCOPE: CPT | Performed by: UROLOGY

## 2019-01-14 PROCEDURE — 99244 OFF/OP CNSLTJ NEW/EST MOD 40: CPT | Performed by: UROLOGY

## 2019-01-14 NOTE — ASSESSMENT & PLAN NOTE
I reviewed the imaging with the patient  She has severe right-sided hydronephrosis with what appears to be complete atrophy of the kidney  There is hydroureteronephrosis down to the mid ureter where there are some surgical clips  We discussed that based on the imaging I do not think there is any function left in the kidney  We will get a renal scan to confirm  We discussed that the kidney has likely been obstructed since her ovarian surgery in 2008  We discussed that if she does have pain and problems with the kidney I would recommend nephrectomy  There is no need for stenting given the lack of function  The patient will follow up after renal scan  If she is still having pain we will discuss possible laparoscopic nephrectomy at that point

## 2019-01-14 NOTE — TELEPHONE ENCOUNTER
New Patient Intake form:    Complaint/Diagnosis:  Hydronephrosis    Insurance:[employer]    History of Cancer: Mother (liver Cancer)    Previous urologist: No  Outside Testing/where:     If yes, what kind:    Records Requested/Where:[facility]    Preferred location:[place]

## 2019-01-14 NOTE — PROGRESS NOTES
Assessment/Plan:    Hydronephrosis  I reviewed the imaging with the patient  She has severe right-sided hydronephrosis with what appears to be complete atrophy of the kidney  There is hydroureteronephrosis down to the mid ureter where there are some surgical clips  We discussed that based on the imaging I do not think there is any function left in the kidney  We will get a renal scan to confirm  We discussed that the kidney has likely been obstructed since her ovarian surgery in 2008  We discussed that if she does have pain and problems with the kidney I would recommend nephrectomy  There is no need for stenting given the lack of function  The patient will follow up after renal scan  If she is still having pain we will discuss possible laparoscopic nephrectomy at that point  Diagnoses and all orders for this visit:    Hydronephrosis, unspecified hydronephrosis type  -     POCT urine dip  -     NM kidney w flow and function; Future          Total visit time was 60 minutes of which over 50% was spent on counseling  Subjective:     Patient ID: Daphney Romeo is a 61 y o  female    28-year-old female presents for evaluation of right-sided hydronephrosis that was noted on recent imaging  The patient has been having 1 week of right-sided back pain  She has been having some associated nausea but no emesis  She denies any fevers or chills  She denies any dysuria or gross hematuria  She is currently on antibiotics  She has a history of bilateral oophorectomy for benign cystic lesions  This was in 2008  She has no other complaints  The following portions of the patient's history were reviewed and updated as appropriate: allergies, current medications, past family history, past medical history, past social history, past surgical history and problem list     Review of Systems   Constitutional: Negative  HENT: Negative  Eyes: Negative  Respiratory: Negative      Cardiovascular: Negative  Gastrointestinal: Negative  Endocrine: Negative  Genitourinary:        As noted per HPI   Musculoskeletal: Negative  Skin: Negative  Allergic/Immunologic: Negative  Neurological: Negative  Hematological: Negative  Psychiatric/Behavioral: Negative  Urinary Incontinence Screening      Most Recent Value   Urinary Incontinence   Urinary Incontinence? Yes [Stress]   Incomplete emptying? No   Urinary frequency? No   Urinary urgency? No   Urinary hesitancy? No   Dysuria (painful difficult urination)? No   Nocturia (waking up to use the bathroom)? Yes [2 times ]   Straining (having to push to go)? No   Weak stream?  No   Intermittent stream?  No   Post void dribbling? No          Objective:    Physical Exam   Constitutional: She is oriented to person, place, and time  She appears well-developed and well-nourished  HENT:   Head: Normocephalic and atraumatic  Neck: Normal range of motion  Neck supple  Cardiovascular: Intact distal pulses  Pulmonary/Chest: Effort normal    Abdominal: Soft  Bowel sounds are normal  She exhibits no distension and no mass  There is no tenderness  There is no rebound and no guarding  Genitourinary:   Genitourinary Comments: Mild right CVA tenderness noted  Musculoskeletal: Normal range of motion  Neurological: She is alert and oriented to person, place, and time  Skin: Skin is warm and dry  Psychiatric: She has a normal mood and affect  Vitals reviewed          Results  No results found for: PSA  Lab Results   Component Value Date    CALCIUM 8 8 01/13/2019    K 3 6 01/13/2019    CO2 25 01/13/2019     01/13/2019    BUN 11 01/13/2019    CREATININE 1 01 01/13/2019     Lab Results   Component Value Date    WBC 7 04 01/13/2019    HGB 12 7 01/13/2019    HCT 37 9 01/13/2019    MCV 94 01/13/2019     01/13/2019       No results found for this or any previous visit (from the past 1 hour(s)) ]

## 2019-01-17 ENCOUNTER — TELEPHONE (OUTPATIENT)
Dept: UROLOGY | Facility: AMBULATORY SURGERY CENTER | Age: 64
End: 2019-01-17

## 2019-01-17 NOTE — TELEPHONE ENCOUNTER
[facility] and asked if patient needs to use lasix or if patient should not?  Please call her back

## 2019-01-18 ENCOUNTER — HOSPITAL ENCOUNTER (OUTPATIENT)
Dept: NUCLEAR MEDICINE | Facility: HOSPITAL | Age: 64
Discharge: HOME | End: 2019-01-18
Attending: UROLOGY
Payer: COMMERCIAL

## 2019-01-18 DIAGNOSIS — N13.30 HYDRONEPHROSIS, UNSPECIFIED HYDRONEPHROSIS TYPE: ICD-10-CM

## 2019-01-18 PROCEDURE — A9562 TC99M MERTIATIDE: HCPCS

## 2019-01-18 PROCEDURE — 78707 K FLOW/FUNCT IMAGE W/O DRUG: CPT

## 2019-01-24 ENCOUNTER — OFFICE VISIT (OUTPATIENT)
Dept: UROLOGY | Facility: HOSPITAL | Age: 64
End: 2019-01-24
Payer: COMMERCIAL

## 2019-01-24 VITALS
HEART RATE: 60 BPM | DIASTOLIC BLOOD PRESSURE: 88 MMHG | SYSTOLIC BLOOD PRESSURE: 126 MMHG | WEIGHT: 137.8 LBS | HEIGHT: 61 IN | BODY MASS INDEX: 26.01 KG/M2

## 2019-01-24 DIAGNOSIS — N13.30 HYDRONEPHROSIS, UNSPECIFIED HYDRONEPHROSIS TYPE: Primary | ICD-10-CM

## 2019-01-24 PROCEDURE — 99213 OFFICE O/P EST LOW 20 MIN: CPT | Performed by: UROLOGY

## 2019-01-24 NOTE — PROGRESS NOTES
Assessment/Plan:    Hydronephrosis  I reviewed the renal scan results with the patient  It demonstrates that there is no function of the right kidney  This is consistent with what I was seeing on the CT scan  Since she is not having any pain or problems at this point we will observe  If she begins having problems with pain and infection we would proceed with hand assisted right laparoscopic nephrectomy  She will follow up in 1 year to make sure she is doing well and will contact us sooner if she is having any problems  Diagnoses and all orders for this visit:    Hydronephrosis, unspecified hydronephrosis type          Total visit time was 15 minutes of which over 50% was spent on counseling  Subjective:     Patient ID: Doretha Scheuermann is a 61 y o  female    70-year-old female presents for follow-up of severe right-sided hydronephrosis  The patient has finished her course of antibiotics and denies any further problems with flank pain  At this point she has no complaints and is here to discuss the renal scan results  The following portions of the patient's history were reviewed and updated as appropriate: allergies, current medications, past family history, past medical history, past social history, past surgical history and problem list     Review of Systems   Constitutional: Negative  HENT: Negative  Eyes: Negative  Respiratory: Negative  Cardiovascular: Negative  Gastrointestinal: Negative  Endocrine: Negative  Genitourinary:        As noted per HPI   Musculoskeletal: Negative  Skin: Negative  Allergic/Immunologic: Negative  Neurological: Negative  Hematological: Negative  Psychiatric/Behavioral: Negative  Urinary Incontinence Screening      Most Recent Value   Urinary Incontinence   Urinary Incontinence? Yes [stress]   Incomplete emptying? Yes   Urinary frequency? No   Urinary urgency? No   Urinary hesitancy?   No   Dysuria (painful difficult urination)? No   Nocturia (waking up to use the bathroom)? Yes [2x]   Straining (having to push to go)? No   Weak stream?  No   Intermittent stream?  No          Objective:    Physical Exam   Constitutional: She is oriented to person, place, and time  She appears well-developed and well-nourished  HENT:   Head: Normocephalic and atraumatic  Neck: Normal range of motion  Neck supple  Cardiovascular: Intact distal pulses  Pulmonary/Chest: Effort normal    Abdominal: Soft  Bowel sounds are normal  She exhibits no distension and no mass  There is no tenderness  There is no rebound and no guarding  Musculoskeletal: Normal range of motion  Neurological: She is alert and oriented to person, place, and time  Skin: Skin is warm and dry  Psychiatric: She has a normal mood and affect  Vitals reviewed          Results  No results found for: PSA  Lab Results   Component Value Date    CALCIUM 8 8 01/13/2019    K 3 6 01/13/2019    CO2 25 01/13/2019     01/13/2019    BUN 11 01/13/2019    CREATININE 1 01 01/13/2019     Lab Results   Component Value Date    WBC 7 04 01/13/2019    HGB 12 7 01/13/2019    HCT 37 9 01/13/2019    MCV 94 01/13/2019     01/13/2019       No results found for this or any previous visit (from the past 1 hour(s)) ]

## 2019-01-24 NOTE — ASSESSMENT & PLAN NOTE
I reviewed the renal scan results with the patient  It demonstrates that there is no function of the right kidney  This is consistent with what I was seeing on the CT scan  Since she is not having any pain or problems at this point we will observe  If she begins having problems with pain and infection we would proceed with hand assisted right laparoscopic nephrectomy  She will follow up in 1 year to make sure she is doing well and will contact us sooner if she is having any problems

## 2019-01-31 ENCOUNTER — TELEPHONE (OUTPATIENT)
Dept: FAMILY MEDICINE CLINIC | Facility: CLINIC | Age: 64
End: 2019-01-31

## 2019-01-31 DIAGNOSIS — K52.9 COLITIS: Primary | ICD-10-CM

## 2019-01-31 NOTE — TELEPHONE ENCOUNTER
Call from patient reports that she was seen in Er 2 weeks ago  Has seen Urology in follow up and found out her 1 Kidney doesn't work at all  She was also diagnosed with Colitis and was told to f/u with GI  She will call [facility] for an appointment      Order put in for [facility] -- patient has Personal Choice -- No referral needed

## 2019-02-22 ENCOUNTER — OFFICE VISIT (OUTPATIENT)
Dept: GASTROENTEROLOGY | Facility: CLINIC | Age: 64
End: 2019-02-22
Payer: COMMERCIAL

## 2019-02-22 VITALS
HEART RATE: 88 BPM | WEIGHT: 140 LBS | BODY MASS INDEX: 26.43 KG/M2 | SYSTOLIC BLOOD PRESSURE: 122 MMHG | DIASTOLIC BLOOD PRESSURE: 74 MMHG | HEIGHT: 61 IN

## 2019-02-22 DIAGNOSIS — K44.9 HIATAL HERNIA: ICD-10-CM

## 2019-02-22 DIAGNOSIS — K59.03 DRUG-INDUCED CONSTIPATION: ICD-10-CM

## 2019-02-22 DIAGNOSIS — Z87.19 HISTORY OF ISCHEMIC COLITIS: ICD-10-CM

## 2019-02-22 DIAGNOSIS — Z12.11 COLON CANCER SCREENING: ICD-10-CM

## 2019-02-22 DIAGNOSIS — D50.9 IRON DEFICIENCY ANEMIA, UNSPECIFIED IRON DEFICIENCY ANEMIA TYPE: ICD-10-CM

## 2019-02-22 DIAGNOSIS — K62.5 RECTAL BLEEDING: ICD-10-CM

## 2019-02-22 DIAGNOSIS — R10.13 EPIGASTRIC PAIN: Primary | ICD-10-CM

## 2019-02-22 DIAGNOSIS — K21.9 GERD WITHOUT ESOPHAGITIS: ICD-10-CM

## 2019-02-22 PROBLEM — E03.9 HYPOTHYROID: Status: ACTIVE | Noted: 2019-02-22

## 2019-02-22 PROCEDURE — 99204 OFFICE O/P NEW MOD 45 MIN: CPT | Performed by: INTERNAL MEDICINE

## 2019-02-22 RX ORDER — POLYETHYLENE GLYCOL 3350 17 G/17G
17 POWDER, FOR SOLUTION ORAL DAILY
Qty: 30 EACH | Refills: 2 | Status: SHIPPED | OUTPATIENT
Start: 2019-02-22 | End: 2019-11-12 | Stop reason: ALTCHOICE

## 2019-02-22 NOTE — PROGRESS NOTES
[facility] - Outpatient Consultation  Loren May 61 y o  female MRN: [id number]  Encounter: [id number]      ASSESSMENT AND PLAN:      1  Epigastric pain    Her pain was probably related to the colitis  The patient transverse colon and descending colon  It resolved  Seems to have a little bit of pain at present  I do not believe this peptic related it 90      2  Rectal bleeding  Resolved this time  Suspect related to ischemic colitis  3  Hiatal hernia  Noted on CT scan -when she was in the ED in January  Patient was noted have a somewhat intrathoracic stomach  She has intermittent chest pain  This needs be further evaluated  4  GERD without esophagitis  He is on appropriate medication with pantoprazole on Protonix  I am concerned that her Fosamax may be causing problems and I will have her discontinue it now    EGD at [facility]    Stop Fosamax    5  Iron deficiency anemia, unspecified iron deficiency anemia type  Most recent hemoglobin has been within normal limits  Will discontinue iron as it causes constipation and we need to prepare for colonoscopy    6  History of ischemic colitis  Please see having under epigastric pain  Will schedule colonoscopy    Colonoscopy at [facility]    7  Drug-induced constipation  Hold on iron for now  Prescription for MiraLax given    8  Colon cancer screening    Overdue for colonoscopy  Note the patient did have an adenomatous polyp back in 2006  Colonoscopy was performed but was suboptimal secondary to limited prep in 2009      Followup Appointment::2-3 mo   ______________________________________________________________________    Chief Complaint   Patient presents with    ER Follow up     For Abd pain       HPI:   Loren May is a 61y o  year old female who presents with for evaluation after recent visit to [facility] emergency room in January    She presented at that time with epigastric abdominal pain with some radiation to the left side along with diarrhea and then subsequently blood per rectum  Patient does have some intermittent hard stool which she attributes taken iron  She had been diagnosed with iron deficiency anemia last year and was placed on oral iron  Sometimes she will have some firm pellets on having a bowel movement  In any event patient's evaluation included lab work and a CT scan of the abdomen and pelvis  The CT scan revealed the presence of colitis from the transverse colon to descending colon  She was discharged from the ER given broad-spectrum antibiotics  She improved over the next several days  Her pain completely resolved  The patient does report she is beginning to get a little discomfort in that same location again at this time  She has not had a colonoscopy in about 10 years  In 2006 she had a colonoscopy which showed a small adenomatous polyp  A recall exam was scheduled for 3 years subsequently in 2009 but the preparation was suboptimal   She has not had an exam since then  Patient does report having frequent heartburn  She is on proton pump inhibitor-Nexium along with famotidine  She is also on Fosamax for osteopenia  Sometimes she will have occasional vomiting and reasonably frequent heartburn and will have to take Tums  She denies any major issues with dysphagia  Interestingly on the CT scan patient did have a hiatal hernia which was described as large and with a intrathoracic component  Patient also reports that during her CAT scan it was noted that she had an atrophic kidney and right kidney was not functioning  This may have been a result of problem previous we her gynecologic surgery years back      Historical Information   Past Medical History:   Diagnosis Date    Colon polyp     Disease of thyroid gland     Gallstones     GI (gastrointestinal bleed)     History of bilateral oophorectomy     Hyperlipidemia     Hypertension     Iron (Fe) deficiency anemia     Osteopenia     Renal disorder      Past Surgical History:   Procedure Laterality Date    APPENDECTOMY      BILATERAL SALPINGOOPHORECTOMY      JOINT REPLACEMENT      KNEE SURGERY      NO PAST SURGERIES      REPLACEMENT TOTAL KNEE BILATERAL       Social History   Social History     Substance and Sexual Activity   Alcohol Use Yes    Frequency: Monthly or less    Drinks per session: 1 or 2    Binge frequency: Never    Comment: social     Social History     Substance and Sexual Activity   Drug Use No     Social History     Tobacco Use   Smoking Status Former Smoker    Types: Cigarettes    Last attempt to quit: 2013    Years since quittin 3   Smokeless Tobacco Never Used     Family History   Problem Relation Age of Onset    Liver cancer Mother     Hypertension Mother     Diabetes Mother     Liver disease Mother     Hypertension Father     Inflammatory bowel disease Father     Diabetes Paternal Grandfather     Substance Abuse Neg Hx     Mental illness Neg Hx        Meds/Allergies       Current Outpatient Medications:     esomeprazole (NexIUM) 40 MG capsule    famotidine (PEPCID) 40 MG tablet    fexofenadine (ALLEGRA) 180 MG tablet    levothyroxine 50 mcg tablet    losartan (COZAAR) 100 MG tablet    polyethylene glycol (MIRALAX) 17 g packet    Allergies   Allergen Reactions    Dog Epithelium Allergy Skin Test Cough         Objective     Blood pressure 122/74, pulse 88, height 5' 0 5" (1 537 m), weight 63 5 kg (140 lb)  Body mass index is 26 89 kg/m²  PHYSICAL EXAM:    General Appearance:  Alert, cooperative, no distress  HEENT:  Normocephalic, atraumatic, anicteric  Neck:  Supple, symmetrical, trachea midline  Chest - mild Kyphosis   Lungs:  Clear to auscultation bilaterally; no rales, rhonchi or wheezing; respirations unlabored   Heart[de-identified]  Regular rate and rhythm; no murmur, rub, or gallop    Abdomen:  Soft, non-tender, non-distended; normal bowel sounds; no masses, no organomegaly   Rectal:  Deferred   Extremities: No cyanosis, clubbing or edema   Skin:  No jaundice, rashes, or lesions   Lymph nodes: No palpable cervical lymphadenopathy  Neuro alert oriented x3 no gross focal abnormality    Lab Results:   Lab Results   Component Value Date    WBC 7 04 01/13/2019    HGB 12 7 01/13/2019    HCT 37 9 01/13/2019    MCV 94 01/13/2019     01/13/2019     Lab Results   Component Value Date    K 3 6 01/13/2019     01/13/2019    CO2 25 01/13/2019    BUN 11 01/13/2019    CREATININE 1 01 01/13/2019    CALCIUM 8 8 01/13/2019    AST 18 01/13/2019    ALT 32 01/13/2019    ALKPHOS 76 01/13/2019    EGFR 59 01/13/2019     Lab Results   Component Value Date    IRON 165 (H) 11/03/2018    FERRITIN 45 11/03/2018     No results found for: LIPASE    Radiology Results:   CT scanning January 9 2019 [address] [place] [address] positive findings consistent with ischemic colitis-sigmoid and descending colon, moderate diverticulosis a tortuous colon large hiatal hernia with intrathoracic stomach  Right-sided hydronephrosis and hydroureter and cortical thinning of the right kidney  US -cholelithiasis    REVIEW OF SYSTEMS:    CONSTITUTIONAL: Denies any fever, chills, rigors, and weight loss  --she does have some fatigue  HEENT: No earache or tinnitus  Denies hearing loss or visual disturbances  CARDIOVASCULAR: No chest pain or palpitations  RESPIRATORY: Denies any cough, hemoptysis, some shortness of breath on exertion  GASTROINTESTINAL: As noted in the History of Present Illness  GENITOURINARY: No problems with urination  Denies any hematuria or dysuria  NEUROLOGIC: No dizziness or vertigo, denies headaches  MUSCULOSKELETAL: Denies any muscle or joint pain  SKIN: Denies skin rashes or itching  ENDOCRINE: Denies excessive thirst  Denies intolerance to heat or cold  PSYCHOSOCIAL: Denies depression or anxiety  Denies any recent memory loss

## 2019-02-22 NOTE — PATIENT INSTRUCTIONS
[facility] - Outpatient Consultation  Yvonne Born 61 y o  female MRN: [id number]  Encounter: [id number]      ASSESSMENT AND PLAN:      1  Epigastric pain    Her pain was probably related to the colitis  The patient transverse colon and descending colon  It resolved  Seems to have a little bit of pain at present  I do not believe this peptic related it 90      2  Rectal bleeding  Resolved this time  Suspect related to ischemic colitis  3  Hiatal hernia  Noted on CT scan -when she was in the ED in January  Patient was noted have a somewhat intrathoracic stomach  She has intermittent chest pain  This needs be further evaluated  4  GERD without esophagitis  He is on appropriate medication with pantoprazole on Protonix  I am concerned that her Fosamax may be causing problems and I will have her discontinue it now    EGD at [facility]    Stop Fosamax    5  Iron deficiency anemia, unspecified iron deficiency anemia type  Most recent hemoglobin has been within normal limits  Will discontinue iron as it causes constipation and we need to prepare for colonoscopy    6  History of ischemic colitis  Please see having under epigastric pain  Will schedule colonoscopy    Colonoscopy at [facility]    7  Drug-induced constipation  Hold on iron for now  Prescription for MiraLax given    8  Colon cancer screening    Overdue for colonoscopy  Note the patient did have an adenomatous polyp back in 2006   Colonoscopy was performed but was suboptimal secondary to limited prep in 2009      Followup Appointment::2-3 mo   Open

## 2019-02-22 NOTE — LETTER
February 22, 2019     Jone Moreno MD  [address] [facility] [address]    Patient: Shine Stapleton   YOB: 1955   Date of Visit: 2/22/2019       Dear Dr Rina Stevens: Thank you for referring Shine Stapleton to me for evaluation  Below are my notes for this consultation  If you have questions, please do not hesitate to call me  I look forward to following your patient along with you  Sincerely,        Zakiya Dominguez MD        CC: No Recipients  Zakiya Dominguez MD  2/22/2019  7:04 PM  Sign at close encounter    [facility] - Outpatient Consultation  Shine Stapleton 61 y o  female MRN: [id number]  Encounter: [id number]      ASSESSMENT AND PLAN:      1  Epigastric pain    Her pain was probably related to the colitis  The patient transverse colon and descending colon  It resolved  Seems to have a little bit of pain at present  I do not believe this peptic related it 90      2  Rectal bleeding  Resolved this time  Suspect related to ischemic colitis  3  Hiatal hernia  Noted on CT scan -when she was in the ED in January  Patient was noted have a somewhat intrathoracic stomach  She has intermittent chest pain  This needs be further evaluated  4  GERD without esophagitis  He is on appropriate medication with pantoprazole on Protonix  I am concerned that her Fosamax may be causing problems and I will have her discontinue it now    EGD at [facility]    Stop Fosamax    5  Iron deficiency anemia, unspecified iron deficiency anemia type  Most recent hemoglobin has been within normal limits  Will discontinue iron as it causes constipation and we need to prepare for colonoscopy    6  History of ischemic colitis  Please see having under epigastric pain  Will schedule colonoscopy    Colonoscopy at [facility]    7  Drug-induced constipation  Hold on iron for now  Prescription for MiraLax given    8  Colon cancer screening    Overdue for colonoscopy  Note the patient did have an adenomatous polyp back in 2006  Colonoscopy was performed but was suboptimal secondary to limited prep in 2009      Followup Appointment::2-3 mo   ______________________________________________________________________    Chief Complaint   Patient presents with    ER Follow up     For Abd pain       HPI:   Constantine Abbasi is a 61y o  year old female who presents with for evaluation after recent visit to [address] [place] [address] emergency room in January  She presented at that time with epigastric abdominal pain with some radiation to the left side along with diarrhea and then subsequently blood per rectum  Patient does have some intermittent hard stool which she attributes taken iron  She had been diagnosed with iron deficiency anemia last year and was placed on oral iron  Sometimes she will have some firm pellets on having a bowel movement  In any event patient's evaluation included lab work and a CT scan of the abdomen and pelvis  The CT scan revealed the presence of colitis from the transverse colon to descending colon  She was discharged from the ER given broad-spectrum antibiotics  She improved over the next several days  Her pain completely resolved  The patient does report she is beginning to get a little discomfort in that same location again at this time  She has not had a colonoscopy in about 10 years  In 2006 she had a colonoscopy which showed a small adenomatous polyp  A recall exam was scheduled for 3 years subsequently in 2009 but the preparation was suboptimal   She has not had an exam since then  Patient does report having frequent heartburn  She is on proton pump inhibitor-Nexium along with famotidine  She is also on Fosamax for osteopenia  Sometimes she will have occasional vomiting and reasonably frequent heartburn and will have to take Tums  She denies any major issues with dysphagia    Interestingly on the CT scan patient did have a hiatal hernia which was described as large and with a intrathoracic component  Patient also reports that during her CAT scan it was noted that she had an atrophic kidney and right kidney was not functioning  This may have been a result of problem previous we her gynecologic surgery years back      Historical Information   Past Medical History:   Diagnosis Date    Colon polyp     Disease of thyroid gland     Gallstones     GI (gastrointestinal bleed)     History of bilateral oophorectomy     Hyperlipidemia     Hypertension     Iron (Fe) deficiency anemia     Osteopenia     Renal disorder      Past Surgical History:   Procedure Laterality Date    APPENDECTOMY      BILATERAL SALPINGOOPHORECTOMY      JOINT REPLACEMENT      KNEE SURGERY      NO PAST SURGERIES      REPLACEMENT TOTAL KNEE BILATERAL       Social History   Social History     Substance and Sexual Activity   Alcohol Use Yes    Frequency: Monthly or less    Drinks per session: 1 or 2    Binge frequency: Never    Comment: social     Social History     Substance and Sexual Activity   Drug Use No     Social History     Tobacco Use   Smoking Status Former Smoker    Types: Cigarettes    Last attempt to quit: 2013    Years since quittin 3   Smokeless Tobacco Never Used     Family History   Problem Relation Age of Onset    Liver cancer Mother     Hypertension Mother     Diabetes Mother     Liver disease Mother     Hypertension Father     Inflammatory bowel disease Father     Diabetes Paternal Grandfather     Substance Abuse Neg Hx     Mental illness Neg Hx        Meds/Allergies       Current Outpatient Medications:     esomeprazole (NexIUM) 40 MG capsule    famotidine (PEPCID) 40 MG tablet    fexofenadine (ALLEGRA) 180 MG tablet    levothyroxine 50 mcg tablet    losartan (COZAAR) 100 MG tablet    polyethylene glycol (MIRALAX) 17 g packet    Allergies   Allergen Reactions    Dog Epithelium Allergy Skin Test Cough Objective     Blood pressure 122/74, pulse 88, height 5' 0 5" (1 537 m), weight 63 5 kg (140 lb)  Body mass index is 26 89 kg/m²  PHYSICAL EXAM:    General Appearance:  Alert, cooperative, no distress  HEENT:  Normocephalic, atraumatic, anicteric  Neck:  Supple, symmetrical, trachea midline  Chest - mild Kyphosis   Lungs:  Clear to auscultation bilaterally; no rales, rhonchi or wheezing; respirations unlabored   Heart[de-identified]  Regular rate and rhythm; no murmur, rub, or gallop  Abdomen:  Soft, non-tender, non-distended; normal bowel sounds; no masses, no organomegaly   Rectal:  Deferred   Extremities: No cyanosis, clubbing or edema   Skin:  No jaundice, rashes, or lesions   Lymph nodes: No palpable cervical lymphadenopathy  Neuro alert oriented x3 no gross focal abnormality    Lab Results:   Lab Results   Component Value Date    WBC 7 04 01/13/2019    HGB 12 7 01/13/2019    HCT 37 9 01/13/2019    MCV 94 01/13/2019     01/13/2019     Lab Results   Component Value Date    K 3 6 01/13/2019     01/13/2019    CO2 25 01/13/2019    BUN 11 01/13/2019    CREATININE 1 01 01/13/2019    CALCIUM 8 8 01/13/2019    AST 18 01/13/2019    ALT 32 01/13/2019    ALKPHOS 76 01/13/2019    EGFR 59 01/13/2019     Lab Results   Component Value Date    IRON 165 (H) 11/03/2018    FERRITIN 45 11/03/2018     No results found for: LIPASE    Radiology Results:   CT scanning January 9 2019 [address] positive findings consistent with ischemic colitis-sigmoid and descending colon, moderate diverticulosis a tortuous colon large hiatal hernia with intrathoracic stomach  Right-sided hydronephrosis and hydroureter and cortical thinning of the right kidney  US -cholelithiasis    REVIEW OF SYSTEMS:    CONSTITUTIONAL: Denies any fever, chills, rigors, and weight loss  --she does have some fatigue  HEENT: No earache or tinnitus  Denies hearing loss or visual disturbances  CARDIOVASCULAR: No chest pain or palpitations  RESPIRATORY: Denies any cough, hemoptysis, some shortness of breath on exertion  GASTROINTESTINAL: As noted in the History of Present Illness  GENITOURINARY: No problems with urination  Denies any hematuria or dysuria  NEUROLOGIC: No dizziness or vertigo, denies headaches  MUSCULOSKELETAL: Denies any muscle or joint pain  SKIN: Denies skin rashes or itching  ENDOCRINE: Denies excessive thirst  Denies intolerance to heat or cold  PSYCHOSOCIAL: Denies depression or anxiety  Denies any recent memory loss

## 2019-02-25 DIAGNOSIS — Z86.010 HX OF COLONIC POLYPS: Primary | ICD-10-CM

## 2019-02-28 ENCOUNTER — HOSPITAL ENCOUNTER (OUTPATIENT)
Dept: RADIOLOGY | Facility: HOSPITAL | Age: 64
Discharge: HOME | End: 2019-02-28
Attending: INTERNAL MEDICINE
Payer: COMMERCIAL

## 2019-02-28 DIAGNOSIS — K44.9 HIATAL HERNIA: ICD-10-CM

## 2019-02-28 PROCEDURE — 74220 X-RAY XM ESOPHAGUS 1CNTRST: CPT

## 2019-03-01 ENCOUNTER — TELEPHONE (OUTPATIENT)
Dept: GASTROENTEROLOGY | Facility: CLINIC | Age: 64
End: 2019-03-01

## 2019-03-01 NOTE — TELEPHONE ENCOUNTER
Pt states she got a nice long mssg from Dr Gerhard Mason on her 's cell phone; asks to spk w/ him; has ques about the hernia being twisted   She is sched'd for a scope and understands she may need to see a surgeon but asks for CB to her cell [phone]; is OK for CB from nurse to start

## 2019-03-01 NOTE — TELEPHONE ENCOUNTER
I called patient after reviewing Dr Jessica Barajas Notes from the Barium Swallow report  I advised that our scheduling department would be contacting her to set up an COMBO EGD/Colon  Also he recommended Dr Emiliano Juan" surgeon I believe to follow up in regards to the Hiatal Hernia     recorded by Nat Zamudio MD on 3/1/2019 at 1:28 PM EST  I called the patient and left a voice message I reviewed the barium swallow   The patient appears to have a paraesophageal hernia   She should have an EGD so we will try to set up  Keyanna Jeter is also due for colonoscopy  Cam Moe could be done at the same time if the patient prefers  [facility] make arrangements  Akbar Grigsby told the patient she probably needs to be seen by surgery Dr Scotty Colvin --for follow up

## 2019-03-01 NOTE — TELEPHONE ENCOUNTER
Patient called back through the answering service  She is already scheduled for the [facility] March 15th  Will await EGD results before proceeding with surgeon consult

## 2019-03-11 ENCOUNTER — TELEPHONE (OUTPATIENT)
Dept: GASTROENTEROLOGY | Facility: CLINIC | Age: 64
End: 2019-03-11

## 2019-03-11 NOTE — TELEPHONE ENCOUNTER
Pt left  mss stating she is supposed to have a colonoscopy and endoscopy Friday and came down w/ stomach virus yesterday; has not kept anything down today or yesterday; assumes she will be over it by Melly Patricio but has ques; asks for  [phone]

## 2019-03-11 NOTE — TELEPHONE ENCOUNTER
Spoke with patient  Is able to drink and is drinking a lot of fluids  Asked her to call back if not better by Wednesday  Has chills and thinks has a fever but does not have thermometer

## 2019-04-09 DIAGNOSIS — K21.00 GASTROESOPHAGEAL REFLUX DISEASE WITH ESOPHAGITIS: ICD-10-CM

## 2019-04-09 RX ORDER — ESOMEPRAZOLE MAGNESIUM 40 MG/1
CAPSULE, DELAYED RELEASE ORAL
Qty: 90 CAPSULE | Refills: 1 | Status: SHIPPED | OUTPATIENT
Start: 2019-04-09 | End: 2019-04-25

## 2019-04-25 ENCOUNTER — OFFICE VISIT (OUTPATIENT)
Dept: GASTROENTEROLOGY | Facility: CLINIC | Age: 64
End: 2019-04-25
Payer: COMMERCIAL

## 2019-04-25 VITALS
WEIGHT: 139 LBS | BODY MASS INDEX: 26.24 KG/M2 | DIASTOLIC BLOOD PRESSURE: 80 MMHG | HEART RATE: 80 BPM | HEIGHT: 61 IN | SYSTOLIC BLOOD PRESSURE: 128 MMHG

## 2019-04-25 DIAGNOSIS — K44.9 LARGE HIATAL HERNIA: Primary | ICD-10-CM

## 2019-04-25 DIAGNOSIS — K55.9 ISCHEMIC COLITIS (HCC): ICD-10-CM

## 2019-04-25 DIAGNOSIS — R05.9 COUGH: ICD-10-CM

## 2019-04-25 DIAGNOSIS — K21.00 GASTROESOPHAGEAL REFLUX DISEASE WITH ESOPHAGITIS: ICD-10-CM

## 2019-04-25 PROCEDURE — 99214 OFFICE O/P EST MOD 30 MIN: CPT | Performed by: INTERNAL MEDICINE

## 2019-04-25 RX ORDER — ESOMEPRAZOLE MAGNESIUM 40 MG/1
40 CAPSULE, DELAYED RELEASE ORAL
Qty: 180 CAPSULE | Refills: 1 | Status: SHIPPED | OUTPATIENT
Start: 2019-04-25 | End: 2019-07-12 | Stop reason: SDUPTHER

## 2019-04-29 ENCOUNTER — TELEPHONE (OUTPATIENT)
Dept: GASTROENTEROLOGY | Facility: CLINIC | Age: 64
End: 2019-04-29

## 2019-05-13 DIAGNOSIS — E03.9 HYPOTHYROIDISM, UNSPECIFIED TYPE: ICD-10-CM

## 2019-05-13 RX ORDER — LEVOTHYROXINE SODIUM 0.05 MG/1
TABLET ORAL
Qty: 90 TABLET | Refills: 1 | Status: SHIPPED | OUTPATIENT
Start: 2019-05-13 | End: 2020-02-11

## 2019-05-17 ENCOUNTER — TELEPHONE (OUTPATIENT)
Dept: GASTROENTEROLOGY | Facility: CLINIC | Age: 64
End: 2019-05-17

## 2019-05-24 ENCOUNTER — TELEPHONE (OUTPATIENT)
Dept: UROLOGY | Facility: MEDICAL CENTER | Age: 64
End: 2019-05-24

## 2019-05-25 DIAGNOSIS — I10 ESSENTIAL HYPERTENSION: ICD-10-CM

## 2019-05-28 RX ORDER — LOSARTAN POTASSIUM 100 MG/1
TABLET ORAL
Qty: 90 TABLET | Refills: 1 | Status: SHIPPED | OUTPATIENT
Start: 2019-05-28 | End: 2019-11-25 | Stop reason: SDUPTHER

## 2019-06-27 DIAGNOSIS — M81.0 OSTEOPOROSIS, UNSPECIFIED OSTEOPOROSIS TYPE, UNSPECIFIED PATHOLOGICAL FRACTURE PRESENCE: ICD-10-CM

## 2019-06-27 RX ORDER — ALENDRONATE SODIUM 70 MG/1
TABLET ORAL
Qty: 12 TABLET | Refills: 5 | Status: SHIPPED | OUTPATIENT
Start: 2019-06-27 | End: 2019-11-12 | Stop reason: ALTCHOICE

## 2019-07-12 DIAGNOSIS — K21.00 GASTROESOPHAGEAL REFLUX DISEASE WITH ESOPHAGITIS: ICD-10-CM

## 2019-07-12 RX ORDER — ESOMEPRAZOLE MAGNESIUM 40 MG/1
40 CAPSULE, DELAYED RELEASE ORAL
Qty: 180 CAPSULE | Refills: 0 | Status: SHIPPED | OUTPATIENT
Start: 2019-07-12 | End: 2019-10-31 | Stop reason: SDUPTHER

## 2019-07-12 RX ORDER — ESOMEPRAZOLE MAGNESIUM 40 MG/1
40 CAPSULE, DELAYED RELEASE ORAL
Qty: 180 CAPSULE | Refills: 1 | Status: CANCELLED | OUTPATIENT
Start: 2019-07-12

## 2019-07-24 ENCOUNTER — OFFICE VISIT (OUTPATIENT)
Dept: FAMILY MEDICINE CLINIC | Facility: CLINIC | Age: 64
End: 2019-07-24
Payer: COMMERCIAL

## 2019-07-24 VITALS
DIASTOLIC BLOOD PRESSURE: 90 MMHG | WEIGHT: 134.6 LBS | RESPIRATION RATE: 18 BRPM | TEMPERATURE: 98.4 F | BODY MASS INDEX: 25.41 KG/M2 | HEART RATE: 71 BPM | SYSTOLIC BLOOD PRESSURE: 132 MMHG | OXYGEN SATURATION: 97 % | HEIGHT: 61 IN

## 2019-07-24 DIAGNOSIS — H60.331 ACUTE SWIMMER'S EAR OF RIGHT SIDE: Primary | ICD-10-CM

## 2019-07-24 PROCEDURE — 1036F TOBACCO NON-USER: CPT | Performed by: NURSE PRACTITIONER

## 2019-07-24 PROCEDURE — 99213 OFFICE O/P EST LOW 20 MIN: CPT | Performed by: NURSE PRACTITIONER

## 2019-07-24 PROCEDURE — 3008F BODY MASS INDEX DOCD: CPT | Performed by: NURSE PRACTITIONER

## 2019-07-24 RX ORDER — CIPROFLOXACIN AND DEXAMETHASONE 3; 1 MG/ML; MG/ML
4 SUSPENSION/ DROPS AURICULAR (OTIC) 2 TIMES DAILY
Qty: 3 ML | Refills: 0 | Status: SHIPPED | OUTPATIENT
Start: 2019-07-24 | End: 2019-11-12 | Stop reason: ALTCHOICE

## 2019-07-24 NOTE — PATIENT INSTRUCTIONS
Ciprodex Drops to right ear as directed  Call/return if symptoms do not improve  Call/return with any problems or concerns

## 2019-07-24 NOTE — PROGRESS NOTES
[address]  [facility]        NAME: Carolyn Beltrán is a 61 y o  female  : 1955    MRN: [id number]  DATE: 2019  TIME: 4:37 PM    Assessment and Plan   Acute swimmer's ear of right side [H60 331]  1  Acute swimmer's ear of right side  ciprofloxacin-dexamethasone (CIPRODEX) otic suspension         Patient Instructions     Patient Instructions   Ciprodex Drops to right ear as directed  Call/return if symptoms do not improve  Call/return with any problems or concerns          Chief Complaint     Chief Complaint   Patient presents with    Earache     patient states that she was on vacation when she got water in her ear, uncomfortable         History of Present Illness       Right earache x 1 week since coming back from vacation  Did a lot of swimming and wate rslides  Feels like there is water in ear and it is tender  Not taking anything for pain  Review of Systems   Review of Systems   Constitutional: Negative for activity change and fever  HENT: Positive for ear pain  Negative for congestion, ear discharge, postnasal drip and sinus pain  Respiratory: Negative for chest tightness and wheezing  Cardiovascular: Negative for chest pain and palpitations  Gastrointestinal: Negative  Neurological: Negative for dizziness, syncope, light-headedness and headaches           Current Medications       Current Outpatient Medications:     esomeprazole (NexIUM) 40 MG capsule, Take 1 capsule (40 mg total) by mouth 2 (two) times a day before meals, Disp: 180 capsule, Rfl: 0    famotidine (PEPCID) 40 MG tablet, Take 1 tablet (40 mg total) by mouth daily, Disp: 90 tablet, Rfl: 3    fexofenadine (ALLEGRA) 180 MG tablet, Take by mouth, Disp: , Rfl:     levothyroxine 50 mcg tablet, TAKE 1 TABLET DAILY, Disp: 90 tablet, Rfl: 1    losartan (COZAAR) 100 MG tablet, TAKE 1 TABLET DAILY, Disp: 90 tablet, Rfl: 1    alendronate (FOSAMAX) 70 mg tablet, TAKE 1 TABLET EVERY WEEK (Patient not taking: Reported on 7/24/2019), Disp: 12 tablet, Rfl: 5    ciprofloxacin-dexamethasone (CIPRODEX) otic suspension, Administer 4 drops to the right ear 2 (two) times a day for 7 days, Disp: 3 mL, Rfl: 0    polyethylene glycol (MIRALAX) 17 g packet, Take 17 g by mouth daily (Patient not taking: Reported on 7/24/2019), Disp: 30 each, Rfl: 2    Sod Picosulfate-Mag Ox-Cit Acd (CLENPIQ) 10-3 5-12 MG-GM -GM/160ML SOLN, As directed (Patient not taking: Reported on 7/24/2019), Disp: 2 Bottle, Rfl: 0    Current Allergies     Allergies as of 07/24/2019 - Reviewed 07/24/2019   Allergen Reaction Noted    Cat hair extract Hives 05/23/2019    Pollen extract Sneezing 05/23/2019    Dog epithelium allergy skin test Cough 01/11/2017            The following portions of the patient's history were reviewed and updated as appropriate: allergies, current medications, past family history, past medical history, past social history, past surgical history and problem list      Past Medical History:   Diagnosis Date    Colon polyp     Disease of thyroid gland     Gallstones     GI (gastrointestinal bleed)     History of bilateral oophorectomy     Hyperlipidemia     Hypertension     Iron (Fe) deficiency anemia     Osteopenia     Renal disorder        Past Surgical History:   Procedure Laterality Date    APPENDECTOMY      BILATERAL SALPINGOOPHORECTOMY      JOINT REPLACEMENT      KNEE SURGERY      NO PAST SURGERIES      REPLACEMENT TOTAL KNEE BILATERAL         Family History   Problem Relation Age of Onset    Liver cancer Mother     Hypertension Mother     Diabetes Mother     Liver disease Mother     Hypertension Father     Inflammatory bowel disease Father     Diabetes Paternal Grandfather     Substance Abuse Neg Hx     Mental illness Neg Hx          Medications have been verified          Objective   /90 (BP Location: Left arm, Patient Position: Sitting, Cuff Size: Standard)   Pulse 71   Temp 98 4 °F (36 9 °C) (Tympanic)   Resp 18   Ht 5' 0 63" (1 54 m)   Wt 61 1 kg (134 lb 9 6 oz)   LMP  (LMP Unknown)   SpO2 97%   Breastfeeding? No   BMI 25 74 kg/m²        Physical Exam     Physical Exam   Constitutional: She is oriented to person, place, and time  Vital signs are normal  She appears well-developed and well-nourished  She is active and cooperative  No distress  HENT:   Head: Normocephalic  Right Ear: No lacerations  There is swelling and tenderness  No drainage  No foreign bodies  Tympanic membrane is erythematous  Tympanic membrane is not bulging  Decreased hearing is noted  Left Ear: External ear normal    Nose: Nose normal    Mouth/Throat: Oropharynx is clear and moist    Eyes: EOM are normal    Cardiovascular: Normal rate, regular rhythm and normal heart sounds  No murmur heard  Pulmonary/Chest: Effort normal and breath sounds normal  No respiratory distress  She has no wheezes  Musculoskeletal: Normal range of motion  Neurological: She is alert and oriented to person, place, and time  Skin: Skin is warm and dry  No rash noted  She is not diaphoretic  No erythema  Psychiatric: She has a normal mood and affect  Her behavior is normal  Judgment and thought content normal    Nursing note and vitals reviewed

## 2019-07-25 DIAGNOSIS — E03.9 HYPOTHYROIDISM, UNSPECIFIED TYPE: ICD-10-CM

## 2019-07-25 DIAGNOSIS — Z00.00 WELLNESS EXAMINATION: ICD-10-CM

## 2019-07-25 DIAGNOSIS — I10 ESSENTIAL HYPERTENSION: Primary | ICD-10-CM

## 2019-07-25 DIAGNOSIS — R73.01 IMPAIRED FASTING GLUCOSE: ICD-10-CM

## 2019-07-25 DIAGNOSIS — E78.2 MIXED HYPERLIPIDEMIA: ICD-10-CM

## 2019-07-25 DIAGNOSIS — Z11.59 ENCOUNTER FOR HEPATITIS C SCREENING TEST FOR LOW RISK PATIENT: ICD-10-CM

## 2019-07-25 DIAGNOSIS — R53.83 FATIGUE, UNSPECIFIED TYPE: ICD-10-CM

## 2019-10-31 DIAGNOSIS — K21.00 GASTROESOPHAGEAL REFLUX DISEASE WITH ESOPHAGITIS: ICD-10-CM

## 2019-10-31 RX ORDER — ESOMEPRAZOLE MAGNESIUM 40 MG/1
CAPSULE, DELAYED RELEASE ORAL
Qty: 180 CAPSULE | Refills: 4 | Status: SHIPPED | OUTPATIENT
Start: 2019-10-31 | End: 2020-06-10

## 2019-11-12 ENCOUNTER — OFFICE VISIT (OUTPATIENT)
Dept: FAMILY MEDICINE CLINIC | Facility: CLINIC | Age: 64
End: 2019-11-12
Payer: COMMERCIAL

## 2019-11-12 VITALS
SYSTOLIC BLOOD PRESSURE: 118 MMHG | WEIGHT: 135 LBS | HEIGHT: 61 IN | HEART RATE: 74 BPM | OXYGEN SATURATION: 97 % | DIASTOLIC BLOOD PRESSURE: 74 MMHG | BODY MASS INDEX: 25.49 KG/M2

## 2019-11-12 DIAGNOSIS — B35.1 TINEA UNGUIUM: Primary | ICD-10-CM

## 2019-11-12 PROCEDURE — 99213 OFFICE O/P EST LOW 20 MIN: CPT | Performed by: FAMILY MEDICINE

## 2019-11-12 RX ORDER — TERBINAFINE HYDROCHLORIDE 250 MG/1
250 TABLET ORAL DAILY
Qty: 30 TABLET | Refills: 2 | Status: SHIPPED | OUTPATIENT
Start: 2019-11-12 | End: 2020-02-10

## 2019-11-12 NOTE — PROGRESS NOTES
[address]  [facility]        NAME: Angle Enriquez is a 61 y o  female  : 1955    MRN: [id number]  DATE: 2019  TIME: 9:05 AM    Assessment and Plan   Tinea unguium [B35 1]  1  Tinea unguium  terbinafine (LamISIL) 250 mg tablet         Patient Instructions     Patient Instructions   lamisil for 90 days--ck labs 4 wks          Chief Complaint     Chief Complaint   Patient presents with    Nail Problem     fingers & toes         History of Present Illness       C/o nail fungus      Review of Systems   Review of Systems   Constitutional: Negative for appetite change, chills, diaphoresis and fever  HENT: Negative for ear pain, rhinorrhea, sinus pressure and sore throat  Eyes: Negative for discharge, redness and itching  Respiratory: Negative for cough, shortness of breath and wheezing  Cardiovascular: Negative for chest pain and palpitations  Gastrointestinal: Negative for abdominal pain, diarrhea, nausea and vomiting           Current Medications       Current Outpatient Medications:     esomeprazole (NexIUM) 40 MG capsule, TAKE 1 CAPSULE TWICE A DAY BEFORE MEALS, Disp: 180 capsule, Rfl: 4    famotidine (PEPCID) 40 MG tablet, Take 1 tablet (40 mg total) by mouth daily, Disp: 90 tablet, Rfl: 3    fexofenadine (ALLEGRA) 180 MG tablet, Take by mouth, Disp: , Rfl:     levothyroxine 50 mcg tablet, TAKE 1 TABLET DAILY, Disp: 90 tablet, Rfl: 1    losartan (COZAAR) 100 MG tablet, TAKE 1 TABLET DAILY, Disp: 90 tablet, Rfl: 1    terbinafine (LamISIL) 250 mg tablet, Take 1 tablet (250 mg total) by mouth daily, Disp: 30 tablet, Rfl: 2    Current Allergies     Allergies as of 2019 - Reviewed 2019   Allergen Reaction Noted    Cat hair extract Hives 2019    Pollen extract Sneezing 2019    Dog epithelium allergy skin test Cough 2017            The following portions of the patient's history were reviewed and updated as appropriate: allergies, current medications, past family history, past medical history, past social history, past surgical history and problem list      Past Medical History:   Diagnosis Date    Colon polyp     Disease of thyroid gland     Gallstones     GI (gastrointestinal bleed)     History of bilateral oophorectomy     Hyperlipidemia     Hypertension     Iron (Fe) deficiency anemia     Osteopenia     Renal disorder        Past Surgical History:   Procedure Laterality Date    APPENDECTOMY      BILATERAL SALPINGOOPHORECTOMY      JOINT REPLACEMENT      KNEE SURGERY      NO PAST SURGERIES      REPLACEMENT TOTAL KNEE BILATERAL         Family History   Problem Relation Age of Onset    Liver cancer Mother     Hypertension Mother     Diabetes Mother     Liver disease Mother     Hypertension Father     Inflammatory bowel disease Father     Diabetes Paternal Grandfather     Substance Abuse Neg Hx     Mental illness Neg Hx          Medications have been verified  Objective   /74   Pulse 74   Ht 5' 0 5" (1 537 m)   Wt 61 2 kg (135 lb)   SpO2 97%   BMI 25 93 kg/m²        Physical Exam     Physical Exam   Constitutional: She appears well-developed and well-nourished  No distress  HENT:   Right Ear: Tympanic membrane, external ear and ear canal normal  Tympanic membrane is not injected  Left Ear: Tympanic membrane, external ear and ear canal normal  Tympanic membrane is not injected  Nose: Nose normal    Mouth/Throat: Oropharynx is clear and moist and mucous membranes are normal    Eyes: Pupils are equal, round, and reactive to light  Conjunctivae and EOM are normal  Right eye exhibits no discharge  Left eye exhibits no discharge  Neck: Normal range of motion  Neck supple  No thyromegaly present  Cardiovascular: Normal rate, regular rhythm and normal heart sounds  No murmur heard  Pulmonary/Chest: Effort normal and breath sounds normal  No respiratory distress  She has no wheezes  Lymphadenopathy:     She has no cervical adenopathy  Skin: She is not diaphoretic  Nursing note and vitals reviewed  BMI Counseling: Body mass index is 25 93 kg/m²  The BMI is above normal  Nutrition recommendations include reducing portion sizes      nailm fungus hands/feet

## 2019-11-25 DIAGNOSIS — I10 ESSENTIAL HYPERTENSION: ICD-10-CM

## 2019-11-26 RX ORDER — LOSARTAN POTASSIUM 100 MG/1
TABLET ORAL
Qty: 90 TABLET | Refills: 4 | Status: SHIPPED | OUTPATIENT
Start: 2019-11-26 | End: 2021-02-18

## 2019-12-18 ENCOUNTER — TELEPHONE (OUTPATIENT)
Dept: FAMILY MEDICINE CLINIC | Facility: CLINIC | Age: 64
End: 2019-12-18

## 2019-12-18 LAB
ALBUMIN SERPL-MCNC: 4.3 G/DL (ref 3.6–4.8)
ALBUMIN/GLOB SERPL: 1.4 {RATIO} (ref 1.2–2.2)
ALP SERPL-CCNC: 81 IU/L (ref 39–117)
ALT SERPL-CCNC: 14 IU/L (ref 0–32)
AST SERPL-CCNC: 19 IU/L (ref 0–40)
BILIRUB SERPL-MCNC: 0.3 MG/DL (ref 0–1.2)
BUN SERPL-MCNC: 15 MG/DL (ref 8–27)
BUN/CREAT SERPL: 15 (ref 12–28)
CALCIUM SERPL-MCNC: 9.2 MG/DL (ref 8.7–10.3)
CHLORIDE SERPL-SCNC: 106 MMOL/L (ref 96–106)
CHOLEST SERPL-MCNC: 209 MG/DL (ref 100–199)
CO2 SERPL-SCNC: 22 MMOL/L (ref 20–29)
CREAT SERPL-MCNC: 1.01 MG/DL (ref 0.57–1)
EST. AVERAGE GLUCOSE BLD GHB EST-MCNC: 117 MG/DL
GLOBULIN SER-MCNC: 3 G/DL (ref 1.5–4.5)
GLUCOSE SERPL-MCNC: 107 MG/DL (ref 65–99)
HBA1C MFR BLD: 5.7 % (ref 4.8–5.6)
HCV AB S/CO SERPL IA: <0.1 S/CO RATIO (ref 0–0.9)
HDLC SERPL-MCNC: 70 MG/DL
IRON SERPL-MCNC: 135 UG/DL (ref 27–139)
LDLC SERPL CALC-MCNC: 122 MG/DL (ref 0–99)
POTASSIUM SERPL-SCNC: 4.2 MMOL/L (ref 3.5–5.2)
PROT SERPL-MCNC: 7.3 G/DL (ref 6–8.5)
SL AMB EGFR AFRICAN AMERICAN: 68 ML/MIN/1.73
SL AMB EGFR NON AFRICAN AMERICAN: 59 ML/MIN/1.73
SODIUM SERPL-SCNC: 142 MMOL/L (ref 134–144)
TRIGL SERPL-MCNC: 85 MG/DL (ref 0–149)
TSH SERPL DL<=0.005 MIU/L-ACNC: 2.94 UIU/ML (ref 0.45–4.5)

## 2020-01-07 ENCOUNTER — OFFICE VISIT (OUTPATIENT)
Dept: FAMILY MEDICINE CLINIC | Facility: CLINIC | Age: 65
End: 2020-01-07
Payer: COMMERCIAL

## 2020-01-07 VITALS
HEART RATE: 86 BPM | DIASTOLIC BLOOD PRESSURE: 82 MMHG | HEIGHT: 61 IN | SYSTOLIC BLOOD PRESSURE: 136 MMHG | BODY MASS INDEX: 26.06 KG/M2 | OXYGEN SATURATION: 98 % | WEIGHT: 138 LBS

## 2020-01-07 DIAGNOSIS — R73.01 IMPAIRED FASTING GLUCOSE: ICD-10-CM

## 2020-01-07 DIAGNOSIS — E78.2 MIXED HYPERLIPIDEMIA: ICD-10-CM

## 2020-01-07 DIAGNOSIS — E06.3 HYPOTHYROIDISM DUE TO HASHIMOTO'S THYROIDITIS: ICD-10-CM

## 2020-01-07 DIAGNOSIS — I10 BENIGN ESSENTIAL HYPERTENSION: ICD-10-CM

## 2020-01-07 DIAGNOSIS — Z00.00 WELLNESS EXAMINATION: ICD-10-CM

## 2020-01-07 DIAGNOSIS — E03.8 HYPOTHYROIDISM DUE TO HASHIMOTO'S THYROIDITIS: ICD-10-CM

## 2020-01-07 DIAGNOSIS — R53.83 FATIGUE, UNSPECIFIED TYPE: Primary | ICD-10-CM

## 2020-01-07 PROCEDURE — 3075F SYST BP GE 130 - 139MM HG: CPT | Performed by: FAMILY MEDICINE

## 2020-01-07 PROCEDURE — 99214 OFFICE O/P EST MOD 30 MIN: CPT | Performed by: FAMILY MEDICINE

## 2020-01-07 PROCEDURE — 3079F DIAST BP 80-89 MM HG: CPT | Performed by: FAMILY MEDICINE

## 2020-01-07 PROCEDURE — 99396 PREV VISIT EST AGE 40-64: CPT | Performed by: FAMILY MEDICINE

## 2020-01-07 PROCEDURE — 3008F BODY MASS INDEX DOCD: CPT | Performed by: FAMILY MEDICINE

## 2020-01-07 RX ORDER — ALENDRONATE SODIUM 70 MG/1
TABLET ORAL
COMMUNITY
Start: 2019-12-12 | End: 2020-06-10

## 2020-01-07 NOTE — PROGRESS NOTES
HPI:  Shukri Reyna is a 59 y o  female here for her yearly health maintenance exam    Patient Active Problem List   Diagnosis    Allergic rhinitis    Benign essential hypertension    Bunion of great toe of right foot    Esophageal reflux    Impaired fasting glucose    Mixed hyperlipidemia    Osteopenia    Hydronephrosis    Hypothyroid    Hiatal hernia     Past Medical History:   Diagnosis Date    Colon polyp     Disease of thyroid gland     Gallstones     GI (gastrointestinal bleed)     History of bilateral oophorectomy     Hyperlipidemia     Hypertension     Iron (Fe) deficiency anemia     Osteopenia     Renal disorder        1  Advanced Directive: n     2  Durable Power of  for Healthcare: n     3  Social History:           Drug and alcohol History: n                  4  Immunizations up to date: y                 Lifestyle:                           Healthy Diet:y                          Alcohol Use:y                          Tobacco Use:n                          Regular exercise:y                          Weight concerns:n                               5  Over the past 2 weeks, how often have you been bothered by the following:              Little interest or pleasure in doing things:n              Felling down, depressed or hopeless:n       Current Outpatient Medications   Medication Sig Dispense Refill    alendronate (FOSAMAX) 70 mg tablet       esomeprazole (NexIUM) 40 MG capsule TAKE 1 CAPSULE TWICE A DAY BEFORE MEALS 180 capsule 4    famotidine (PEPCID) 40 MG tablet Take 1 tablet (40 mg total) by mouth daily 90 tablet 3    fexofenadine (ALLEGRA) 180 MG tablet Take by mouth      levothyroxine 50 mcg tablet TAKE 1 TABLET DAILY 90 tablet 1    losartan (COZAAR) 100 MG tablet TAKE 1 TABLET DAILY 90 tablet 4    terbinafine (LamISIL) 250 mg tablet Take 1 tablet (250 mg total) by mouth daily 30 tablet 2     No current facility-administered medications for this visit  Allergies   Allergen Reactions    Cat Hair Extract Hives    Pollen Extract Sneezing    Dog Epithelium Allergy Skin Test Cough     Immunization History   Administered Date(s) Administered    INFLUENZA 10/18/2014, 11/04/2018, 10/10/2019    Influenza Injectable, MDCK, Preservative Free, Quadrivalent, 0 5 mL 11/10/2019    Influenza Quadrivalent Preservative Free 3 years and older IM 10/13/2015, 10/31/2017    Influenza TIV (IM) 1955    Pneumococcal Polysaccharide PPV23 10/13/2015    Zoster 03/07/2016       Patient Care Team:  Luzma Oleary MD as PCP - General    Review of Systems   Constitutional: Negative for appetite change, chills, diaphoresis and fever  HENT: Negative for ear pain, rhinorrhea, sinus pressure and sore throat  Eyes: Negative for discharge, redness and itching  Respiratory: Negative for cough, shortness of breath and wheezing  Cardiovascular: Negative for chest pain and palpitations  Gastrointestinal: Negative for abdominal pain, diarrhea, nausea and vomiting  Physical Exam :  Physical Exam   Constitutional: She appears well-developed and well-nourished  No distress  HENT:   Right Ear: Tympanic membrane, external ear and ear canal normal  Tympanic membrane is not injected  Left Ear: Tympanic membrane, external ear and ear canal normal  Tympanic membrane is not injected  Nose: Nose normal    Mouth/Throat: Oropharynx is clear and moist and mucous membranes are normal    Eyes: Pupils are equal, round, and reactive to light  Conjunctivae and EOM are normal  Right eye exhibits no discharge  Left eye exhibits no discharge  Neck: Normal range of motion  Neck supple  No thyromegaly present  Cardiovascular: Normal rate, regular rhythm and normal heart sounds  No murmur heard  Pulmonary/Chest: Effort normal and breath sounds normal  No respiratory distress  She has no wheezes  Lymphadenopathy:     She has no cervical adenopathy  Skin: She is not diaphoretic  Nursing note and vitals reviewed  Assessment and Plan:  1  Fatigue, unspecified type  CBC and differential    CBC and differential   2  Hypothyroidism due to Hashimoto's thyroiditis     3  Impaired fasting glucose     4  Benign essential hypertension     5  Mixed hyperlipidemia     6   Wellness examination         Health Maintenance Due   Topic Date Due    CRC Screening: Licha  11/24/2005    MAMMOGRAM  10/10/2017

## 2020-01-07 NOTE — PROGRESS NOTES
[address] [facility]        NAME: Corazon Hood is a 59 y o  female  : 1955    MRN: [id number]  DATE: 2020  TIME: 3:37 PM    Assessment and Plan   Fatigue, unspecified type [R53 83]  1  Fatigue, unspecified type  CBC and differential    CBC and differential   2  Hypothyroidism due to Hashimoto's thyroiditis     3  Impaired fasting glucose     4  Benign essential hypertension     5  Mixed hyperlipidemia     6  Wellness examination           Patient Instructions     Patient Instructions   Same meds          Chief Complaint     Chief Complaint   Patient presents with    Follow-up     MM/labs    Annual Exam     HM         History of Present Illness       F/u assessed med cond--stable      Review of Systems   Review of Systems   Constitutional: Negative for fatigue, fever and unexpected weight change  HENT: Negative for congestion, sinus pain and sore throat  Eyes: Negative for visual disturbance  Respiratory: Negative for shortness of breath and wheezing  Cardiovascular: Negative for chest pain and palpitations  Gastrointestinal: Negative for abdominal pain, nausea and vomiting  Musculoskeletal: Negative  Negative for arthralgias and myalgias  Neurological: Negative for syncope, weakness and numbness  Psychiatric/Behavioral: Negative  Negative for confusion, dysphoric mood and suicidal ideas           Current Medications       Current Outpatient Medications:     alendronate (FOSAMAX) 70 mg tablet, , Disp: , Rfl:     esomeprazole (NexIUM) 40 MG capsule, TAKE 1 CAPSULE TWICE A DAY BEFORE MEALS, Disp: 180 capsule, Rfl: 4    famotidine (PEPCID) 40 MG tablet, Take 1 tablet (40 mg total) by mouth daily, Disp: 90 tablet, Rfl: 3    fexofenadine (ALLEGRA) 180 MG tablet, Take by mouth, Disp: , Rfl:     levothyroxine 50 mcg tablet, TAKE 1 TABLET DAILY, Disp: 90 tablet, Rfl: 1    losartan (COZAAR) 100 MG tablet, TAKE 1 TABLET DAILY, Disp: 90 tablet, Rfl: 4   terbinafine (LamISIL) 250 mg tablet, Take 1 tablet (250 mg total) by mouth daily, Disp: 30 tablet, Rfl: 2    Current Allergies     Allergies as of 01/07/2020 - Reviewed 01/07/2020   Allergen Reaction Noted    Cat hair extract Hives 05/23/2019    Pollen extract Sneezing 05/23/2019    Dog epithelium allergy skin test Cough 01/11/2017            The following portions of the patient's history were reviewed and updated as appropriate: allergies, current medications, past family history, past medical history, past social history, past surgical history and problem list      Past Medical History:   Diagnosis Date    Colon polyp     Disease of thyroid gland     Gallstones     GI (gastrointestinal bleed)     History of bilateral oophorectomy     Hyperlipidemia     Hypertension     Iron (Fe) deficiency anemia     Osteopenia     Renal disorder        Past Surgical History:   Procedure Laterality Date    APPENDECTOMY      BILATERAL SALPINGOOPHORECTOMY      JOINT REPLACEMENT      KNEE SURGERY      NO PAST SURGERIES      REPLACEMENT TOTAL KNEE BILATERAL         Family History   Problem Relation Age of Onset    Liver cancer Mother     Hypertension Mother     Diabetes Mother     Liver disease Mother     Hypertension Father     Inflammatory bowel disease Father     Diabetes Paternal Grandfather     Substance Abuse Neg Hx     Mental illness Neg Hx          Medications have been verified  Objective   /82   Pulse 86   Ht 5' 0 5" (1 537 m)   Wt 62 6 kg (138 lb)   SpO2 98%   BMI 26 51 kg/m²        Physical Exam     Physical Exam   Constitutional: She is oriented to person, place, and time  Vital signs are normal  She appears well-developed and well-nourished  HENT:   Right Ear: Ear canal normal  Tympanic membrane is not injected  Left Ear: Ear canal normal  Tympanic membrane is not injected     Nose: Nose normal    Mouth/Throat: Oropharynx is clear and moist    Eyes: Pupils are equal, round, and reactive to light  Conjunctivae and EOM are normal  Right eye exhibits no discharge  Left eye exhibits no discharge  Neck: Normal range of motion  Neck supple  No thyromegaly present  Cardiovascular: Normal rate, regular rhythm and normal heart sounds  No murmur heard  Pulmonary/Chest: Effort normal and breath sounds normal  No respiratory distress  She has no wheezes  Abdominal: Soft  Bowel sounds are normal  She exhibits no distension  There is no tenderness  Musculoskeletal: Normal range of motion  Lymphadenopathy:     She has no cervical adenopathy  Neurological: She is alert and oriented to person, place, and time  She has normal strength and normal reflexes  She is not disoriented  No sensory deficit  Gait normal    Skin: Skin is warm and dry  Psychiatric: She has a normal mood and affect  Her speech is normal and behavior is normal  Judgment and thought content normal  Cognition and memory are normal      BMI Counseling: Body mass index is 26 51 kg/m²  The BMI is above normal  Nutrition recommendations include reducing portion sizes

## 2020-01-08 ENCOUNTER — VBI (OUTPATIENT)
Dept: FAMILY MEDICINE CLINIC | Facility: CLINIC | Age: 65
End: 2020-01-08

## 2020-01-08 NOTE — LETTER
Procedure Request Form: Colonoscopy      Date Requested: 20  Patient: Amy Christine  Patient : 1955   Referring Provider: Stefania Shay, MD        Date of Procedure ______________________________       The above patient has informed us that they have completed their   most recent Colonoscopy at your facility  Please complete   this form and attach all corresponding procedure reports/results  Comments __________________________________________________________  ____________________________________________________________________  ____________________________________________________________________  ____________________________________________________________________    Facility Completing Procedure _________________________________________    Form Completed By (print name) _______________________________________      Signature __________________________________________________________      These reports are needed for  compliance    Please fax this completed form and a copy of the procedure report to our office as soon as possible to [phone] marc Odom: Phone [phone]    We thank you for your assistance in treating our mutual patient

## 2020-01-13 ENCOUNTER — TELEPHONE (OUTPATIENT)
Dept: FAMILY MEDICINE CLINIC | Facility: CLINIC | Age: 65
End: 2020-01-13

## 2020-01-13 NOTE — TELEPHONE ENCOUNTER
----- Message from Luzma Oleary MD sent at 1/10/2020 12:26 PM EST -----  MM/LABS 12MOS---document colo

## 2020-01-28 NOTE — PROGRESS NOTES
1/29/2020    Jose Elias Martinez  1955  [id number]        Assessment  -Right-sided Hydronephrosis    Discussion/Plan  Misty Fregoso is a 59 y o  female being managed by Dr Loren Esposito       -We reviewed results of her most recent creatinine from 12/17/19 which was 1 01  This is unchanged compared to previous BMP 1 year ago  No recent imaging to review  Due to her reports of occasional right lower back discomfort, I recommend obtaining a renal ultrasound to reassess right-sided hydronephrosis  She is otherwise doing well without any urinary complaints  -Follow-up in 3 months to review results of renal ultrasound and BMP  She was instructed to call with any issues  -All questions answered, patients agree with plan     History of Present Illness  59 y o  female with a history of hydronephrosis presents today for follow up  Patient with history of right sided hydronephrosis seen on CT and renal lasix scan  Dr Loren Esposito had previously discussed hand assisted right laparoscopic nephrectomy as there is no function of right kidney, however patient wished to observe as she is asymptomatic  Her last creatinine 01/13/2019 was 1 01  In she reports occasional episodes of right lower back discomfort  She describes as an intermittent, dull, ache and has been ongoing for several months  Patient unsure if this is musculoskeletal   She denies any additional lower urinary tract symptoms, gross hematuria, or dysuria  Patient denies any prior urologic history, surgical intervention, or instrumentation  Review of Systems  Review of Systems   Constitutional: Negative  HENT: Negative  Respiratory: Negative  Cardiovascular: Negative  Gastrointestinal: Negative  Genitourinary: Negative for decreased urine volume, difficulty urinating, dysuria, flank pain, frequency, hematuria and urgency  Musculoskeletal: Negative  Skin: Negative  Neurological: Negative  Psychiatric/Behavioral: Negative          Past Medical History  Past Medical History:   Diagnosis Date    Colon polyp     Disease of thyroid gland     Gallstones     GI (gastrointestinal bleed)     History of bilateral oophorectomy     Hyperlipidemia     Hypertension     Iron (Fe) deficiency anemia     Osteopenia     Renal disorder        Past Social History  Past Surgical History:   Procedure Laterality Date    APPENDECTOMY      BILATERAL SALPINGOOPHORECTOMY      JOINT REPLACEMENT      KNEE SURGERY      NO PAST SURGERIES      REPLACEMENT TOTAL KNEE BILATERAL         Past Family History  Family History   Problem Relation Age of Onset    Liver cancer Mother    [de-identified] Hypertension Mother     Diabetes Mother     Liver disease Mother     Hypertension Father     Inflammatory bowel disease Father     Diabetes Paternal Grandfather     Substance Abuse Neg Hx     Mental illness Neg Hx        Past Social history  Social History     Socioeconomic History    Marital status: /Civil Union     Spouse name: Not on file    Number of children: Not on file    Years of education: Not on file    Highest education level: Not on file   Occupational History    Not on file   Social Needs    Financial resource strain: Not on file    Food insecurity:     Worry: Not on file     Inability: Not on file    Transportation needs:     Medical: Not on file     Non-medical: Not on file   Tobacco Use    Smoking status: Former Smoker     Types: Cigarettes     Last attempt to quit: 2013     Years since quittin 2    Smokeless tobacco: Never Used   Substance and Sexual Activity    Alcohol use: Yes     Frequency: Monthly or less     Drinks per session: 1 or 2     Binge frequency: Never     Comment: social    Drug use: No    Sexual activity: Yes   Lifestyle    Physical activity:     Days per week: Not on file     Minutes per session: Not on file    Stress: Not on file   Relationships    Social connections:     Talks on phone: Not on file     Gets together: Not on file     Attends Orthodox service: Not on file     Active member of club or organization: Not on file     Attends meetings of clubs or organizations: Not on file     Relationship status: Not on file    Intimate partner violence:     Fear of current or ex partner: Not on file     Emotionally abused: Not on file     Physically abused: Not on file     Forced sexual activity: Not on file   Other Topics Concern    Not on file   Social History Narrative    Not on file       Current Medications  Current Outpatient Medications   Medication Sig Dispense Refill    alendronate (FOSAMAX) 70 mg tablet       esomeprazole (NexIUM) 40 MG capsule TAKE 1 CAPSULE TWICE A DAY BEFORE MEALS 180 capsule 4    famotidine (PEPCID) 40 MG tablet Take 1 tablet (40 mg total) by mouth daily 90 tablet 3    fexofenadine (ALLEGRA) 180 MG tablet Take by mouth      levothyroxine 50 mcg tablet TAKE 1 TABLET DAILY 90 tablet 1    losartan (COZAAR) 100 MG tablet TAKE 1 TABLET DAILY 90 tablet 4    terbinafine (LamISIL) 250 mg tablet Take 1 tablet (250 mg total) by mouth daily 30 tablet 2     No current facility-administered medications for this visit  Allergies  Allergies   Allergen Reactions    Cat Hair Extract Hives    Pollen Extract Sneezing    Dog Epithelium Allergy Skin Test Cough       Past medical history, social history, family history, medications and allergies were reviewed  Vitals  There were no vitals filed for this visit  Physical Exam  Physical Exam   Constitutional: She is oriented to person, place, and time  She appears well-developed and well-nourished  HENT:   Head: Normocephalic  Eyes: Pupils are equal, round, and reactive to light  Neck: Normal range of motion  Cardiovascular: Normal rate and regular rhythm  Pulmonary/Chest: Effort normal    Abdominal: Soft  Normal appearance  There is no CVA tenderness     Genitourinary:   Genitourinary Comments: Negative CVA tenderness Musculoskeletal: Normal range of motion  Neurological: She is alert and oriented to person, place, and time  Skin: Skin is warm and dry  Psychiatric: She has a normal mood and affect  Her behavior is normal  Judgment and thought content normal        Results    I have personally reviewed all pertinent lab results and reviewed with patient  Lab Results   Component Value Date    CALCIUM 8 8 01/13/2019    K 4 2 12/17/2019    CO2 22 12/17/2019     12/17/2019    BUN 15 12/17/2019    CREATININE 1 01 (H) 12/17/2019     Lab Results   Component Value Date    WBC 7 04 01/13/2019    HGB 12 7 01/13/2019    HCT 37 9 01/13/2019    MCV 94 01/13/2019     01/13/2019     No results found for this or any previous visit (from the past 1 hour(s))

## 2020-01-29 ENCOUNTER — OFFICE VISIT (OUTPATIENT)
Dept: UROLOGY | Facility: HOSPITAL | Age: 65
End: 2020-01-29
Payer: COMMERCIAL

## 2020-01-29 VITALS
BODY MASS INDEX: 22.99 KG/M2 | HEIGHT: 65 IN | HEART RATE: 82 BPM | DIASTOLIC BLOOD PRESSURE: 82 MMHG | SYSTOLIC BLOOD PRESSURE: 130 MMHG | WEIGHT: 138 LBS

## 2020-01-29 DIAGNOSIS — N13.30 HYDRONEPHROSIS OF RIGHT KIDNEY: Primary | ICD-10-CM

## 2020-01-29 PROCEDURE — 99213 OFFICE O/P EST LOW 20 MIN: CPT | Performed by: NURSE PRACTITIONER

## 2020-02-08 DIAGNOSIS — E03.9 HYPOTHYROIDISM, UNSPECIFIED TYPE: ICD-10-CM

## 2020-02-16 RX ORDER — LEVOTHYROXINE SODIUM 0.05 MG/1
TABLET ORAL
Qty: 90 TABLET | Refills: 1 | Status: SHIPPED | OUTPATIENT
Start: 2020-02-16 | End: 2020-08-06

## 2020-02-27 ENCOUNTER — TRANSCRIBE ORDERS (OUTPATIENT)
Dept: ADMINISTRATIVE | Facility: HOSPITAL | Age: 65
End: 2020-02-27

## 2020-02-27 DIAGNOSIS — G47.30 SLEEP APNEA, UNSPECIFIED TYPE: Primary | ICD-10-CM

## 2020-04-07 ENCOUNTER — TELEMEDICINE (OUTPATIENT)
Dept: FAMILY MEDICINE CLINIC | Facility: CLINIC | Age: 65
End: 2020-04-07
Payer: COMMERCIAL

## 2020-04-07 DIAGNOSIS — J01.00 ACUTE NON-RECURRENT MAXILLARY SINUSITIS: Primary | ICD-10-CM

## 2020-04-07 PROCEDURE — 99213 OFFICE O/P EST LOW 20 MIN: CPT | Performed by: FAMILY MEDICINE

## 2020-04-07 RX ORDER — AZITHROMYCIN 250 MG/1
TABLET, FILM COATED ORAL
Qty: 6 TABLET | Refills: 0 | Status: SHIPPED | OUTPATIENT
Start: 2020-04-07 | End: 2020-04-12

## 2020-04-07 RX ORDER — PREDNISONE 20 MG/1
TABLET ORAL
Qty: 15 TABLET | Refills: 0 | Status: SHIPPED | OUTPATIENT
Start: 2020-04-07 | End: 2022-02-23

## 2020-04-07 RX ORDER — PROMETHAZINE HYDROCHLORIDE AND CODEINE PHOSPHATE 6.25; 1 MG/5ML; MG/5ML
5 SYRUP ORAL EVERY 4 HOURS PRN
Qty: 240 ML | Refills: 1 | Status: SHIPPED | OUTPATIENT
Start: 2020-04-07 | End: 2022-02-23

## 2020-04-20 ENCOUNTER — TELEPHONE (OUTPATIENT)
Dept: GASTROENTEROLOGY | Facility: CLINIC | Age: 65
End: 2020-04-20

## 2020-04-20 ENCOUNTER — HOSPITAL ENCOUNTER (OUTPATIENT)
Dept: ULTRASOUND IMAGING | Facility: HOSPITAL | Age: 65
Discharge: HOME | End: 2020-04-20
Payer: COMMERCIAL

## 2020-04-20 DIAGNOSIS — N13.30 HYDRONEPHROSIS OF RIGHT KIDNEY: ICD-10-CM

## 2020-04-20 PROCEDURE — 76770 US EXAM ABDO BACK WALL COMP: CPT

## 2020-04-21 ENCOUNTER — TELEPHONE (OUTPATIENT)
Dept: FAMILY MEDICINE CLINIC | Facility: CLINIC | Age: 65
End: 2020-04-21

## 2020-04-21 LAB
BASOPHILS # BLD AUTO: 0 X10E3/UL (ref 0–0.2)
BASOPHILS NFR BLD AUTO: 1 %
BUN SERPL-MCNC: 20 MG/DL (ref 8–27)
BUN/CREAT SERPL: 19 (ref 12–28)
CALCIUM SERPL-MCNC: 9.4 MG/DL (ref 8.7–10.3)
CHLORIDE SERPL-SCNC: 103 MMOL/L (ref 96–106)
CO2 SERPL-SCNC: 24 MMOL/L (ref 20–29)
CREAT SERPL-MCNC: 1.08 MG/DL (ref 0.57–1)
EOSINOPHIL # BLD AUTO: 0.2 X10E3/UL (ref 0–0.4)
EOSINOPHIL NFR BLD AUTO: 3 %
ERYTHROCYTE [DISTWIDTH] IN BLOOD BY AUTOMATED COUNT: 13 % (ref 11.7–15.4)
GLUCOSE SERPL-MCNC: 103 MG/DL (ref 65–99)
HCT VFR BLD AUTO: 36.2 % (ref 34–46.6)
HGB BLD-MCNC: 12.2 G/DL (ref 11.1–15.9)
IMM GRANULOCYTES # BLD: 0 X10E3/UL (ref 0–0.1)
IMM GRANULOCYTES NFR BLD: 0 %
LYMPHOCYTES # BLD AUTO: 0.9 X10E3/UL (ref 0.7–3.1)
LYMPHOCYTES NFR BLD AUTO: 18 %
MCH RBC QN AUTO: 30.8 PG (ref 26.6–33)
MCHC RBC AUTO-ENTMCNC: 33.7 G/DL (ref 31.5–35.7)
MCV RBC AUTO: 91 FL (ref 79–97)
MONOCYTES # BLD AUTO: 0.5 X10E3/UL (ref 0.1–0.9)
MONOCYTES NFR BLD AUTO: 10 %
NEUTROPHILS # BLD AUTO: 3.4 X10E3/UL (ref 1.4–7)
NEUTROPHILS NFR BLD AUTO: 68 %
PLATELET # BLD AUTO: 278 X10E3/UL (ref 150–450)
POTASSIUM SERPL-SCNC: 4.2 MMOL/L (ref 3.5–5.2)
RBC # BLD AUTO: 3.96 X10E6/UL (ref 3.77–5.28)
SL AMB EGFR AFRICAN AMERICAN: 63 ML/MIN/1.73
SL AMB EGFR NON AFRICAN AMERICAN: 54 ML/MIN/1.73
SODIUM SERPL-SCNC: 140 MMOL/L (ref 134–144)
WBC # BLD AUTO: 5.1 X10E3/UL (ref 3.4–10.8)

## 2020-04-22 ENCOUNTER — TELEPHONE (OUTPATIENT)
Dept: UROLOGY | Facility: AMBULATORY SURGERY CENTER | Age: 65
End: 2020-04-22

## 2020-04-29 ENCOUNTER — TELEMEDICINE (OUTPATIENT)
Dept: UROLOGY | Facility: AMBULATORY SURGERY CENTER | Age: 65
End: 2020-04-29
Payer: COMMERCIAL

## 2020-04-29 DIAGNOSIS — N13.30 HYDRONEPHROSIS, UNSPECIFIED HYDRONEPHROSIS TYPE: Primary | ICD-10-CM

## 2020-04-29 PROCEDURE — 99213 OFFICE O/P EST LOW 20 MIN: CPT | Performed by: NURSE PRACTITIONER

## 2020-05-19 ENCOUNTER — OFFICE VISIT (OUTPATIENT)
Dept: SLEEP CENTER | Facility: HOSPITAL | Age: 65
End: 2020-05-19
Payer: COMMERCIAL

## 2020-05-19 VITALS
SYSTOLIC BLOOD PRESSURE: 100 MMHG | DIASTOLIC BLOOD PRESSURE: 70 MMHG | HEART RATE: 78 BPM | HEIGHT: 65 IN | BODY MASS INDEX: 23.82 KG/M2 | WEIGHT: 143 LBS

## 2020-05-19 DIAGNOSIS — I10 BENIGN ESSENTIAL HYPERTENSION: ICD-10-CM

## 2020-05-19 DIAGNOSIS — G47.19 EXCESSIVE DAYTIME SLEEPINESS: ICD-10-CM

## 2020-05-19 DIAGNOSIS — G47.9 SLEEP DISTURBANCE: ICD-10-CM

## 2020-05-19 DIAGNOSIS — F45.8 BRUXISM: ICD-10-CM

## 2020-05-19 DIAGNOSIS — G47.30 SLEEP APNEA, UNSPECIFIED TYPE: Primary | ICD-10-CM

## 2020-05-19 PROCEDURE — 99244 OFF/OP CNSLTJ NEW/EST MOD 40: CPT | Performed by: INTERNAL MEDICINE

## 2020-06-02 ENCOUNTER — HOSPITAL ENCOUNTER (OUTPATIENT)
Dept: SLEEP CENTER | Facility: HOSPITAL | Age: 65
Discharge: HOME | End: 2020-06-02
Attending: INTERNAL MEDICINE
Payer: COMMERCIAL

## 2020-06-02 DIAGNOSIS — G47.19 EXCESSIVE DAYTIME SLEEPINESS: ICD-10-CM

## 2020-06-02 DIAGNOSIS — G47.9 SLEEP DISTURBANCE: ICD-10-CM

## 2020-06-02 DIAGNOSIS — G47.30 SLEEP APNEA, UNSPECIFIED TYPE: ICD-10-CM

## 2020-06-02 PROCEDURE — G0399 HOME SLEEP TEST/TYPE 3 PORTA: HCPCS

## 2020-06-09 ENCOUNTER — TELEPHONE (OUTPATIENT)
Dept: SLEEP CENTER | Facility: CLINIC | Age: 65
End: 2020-06-09

## 2020-06-09 ENCOUNTER — OFFICE VISIT (OUTPATIENT)
Dept: SLEEP CENTER | Facility: HOSPITAL | Age: 65
End: 2020-06-09
Payer: COMMERCIAL

## 2020-06-09 VITALS
HEART RATE: 78 BPM | SYSTOLIC BLOOD PRESSURE: 118 MMHG | BODY MASS INDEX: 24.16 KG/M2 | WEIGHT: 145 LBS | DIASTOLIC BLOOD PRESSURE: 70 MMHG | HEIGHT: 65 IN

## 2020-06-09 DIAGNOSIS — G47.33 OSA (OBSTRUCTIVE SLEEP APNEA): Primary | ICD-10-CM

## 2020-06-09 DIAGNOSIS — J30.1 NON-SEASONAL ALLERGIC RHINITIS DUE TO POLLEN: ICD-10-CM

## 2020-06-09 DIAGNOSIS — I10 BENIGN ESSENTIAL HYPERTENSION: ICD-10-CM

## 2020-06-09 DIAGNOSIS — G47.19 EXCESSIVE DAYTIME SLEEPINESS: ICD-10-CM

## 2020-06-09 DIAGNOSIS — K21.00 GASTROESOPHAGEAL REFLUX DISEASE WITH ESOPHAGITIS: ICD-10-CM

## 2020-06-09 PROCEDURE — 3008F BODY MASS INDEX DOCD: CPT | Performed by: INTERNAL MEDICINE

## 2020-06-09 PROCEDURE — 1036F TOBACCO NON-USER: CPT | Performed by: INTERNAL MEDICINE

## 2020-06-09 PROCEDURE — 3074F SYST BP LT 130 MM HG: CPT | Performed by: INTERNAL MEDICINE

## 2020-06-09 PROCEDURE — 3078F DIAST BP <80 MM HG: CPT | Performed by: INTERNAL MEDICINE

## 2020-06-09 PROCEDURE — 99214 OFFICE O/P EST MOD 30 MIN: CPT | Performed by: INTERNAL MEDICINE

## 2020-06-10 ENCOUNTER — TELEMEDICINE (OUTPATIENT)
Dept: GASTROENTEROLOGY | Facility: CLINIC | Age: 65
End: 2020-06-10
Payer: COMMERCIAL

## 2020-06-10 VITALS — WEIGHT: 145 LBS | HEIGHT: 65 IN | BODY MASS INDEX: 24.16 KG/M2

## 2020-06-10 DIAGNOSIS — K44.9 HIATAL HERNIA: ICD-10-CM

## 2020-06-10 DIAGNOSIS — Z86.010 PERSONAL HISTORY OF COLONIC POLYPS: ICD-10-CM

## 2020-06-10 DIAGNOSIS — R06.00 DYSPNEA ON EXERTION: ICD-10-CM

## 2020-06-10 DIAGNOSIS — K21.00 GASTROESOPHAGEAL REFLUX DISEASE WITH ESOPHAGITIS: Primary | ICD-10-CM

## 2020-06-10 DIAGNOSIS — G47.33 OSA (OBSTRUCTIVE SLEEP APNEA): ICD-10-CM

## 2020-06-10 PROCEDURE — 3008F BODY MASS INDEX DOCD: CPT | Performed by: INTERNAL MEDICINE

## 2020-06-10 PROCEDURE — 99214 OFFICE O/P EST MOD 30 MIN: CPT | Performed by: INTERNAL MEDICINE

## 2020-06-10 RX ORDER — DEXLANSOPRAZOLE 60 MG/1
60 CAPSULE, DELAYED RELEASE ORAL DAILY
Qty: 90 CAPSULE | Refills: 3 | Status: SHIPPED | OUTPATIENT
Start: 2020-06-10 | End: 2021-01-18 | Stop reason: SDUPTHER

## 2020-07-01 ENCOUNTER — TELEPHONE (OUTPATIENT)
Dept: SLEEP CENTER | Facility: CLINIC | Age: 65
End: 2020-07-01

## 2020-07-01 NOTE — TELEPHONE ENCOUNTER
Pt  was set up in [facility] yesterday 6/30/2020 on PAP 5-15cm and given an AirFit F30 mask

## 2020-08-06 DIAGNOSIS — E03.9 HYPOTHYROIDISM, UNSPECIFIED TYPE: ICD-10-CM

## 2020-08-06 RX ORDER — LEVOTHYROXINE SODIUM 0.05 MG/1
TABLET ORAL
Qty: 90 TABLET | Refills: 3 | Status: SHIPPED | OUTPATIENT
Start: 2020-08-06 | End: 2021-08-02

## 2020-09-01 ENCOUNTER — OFFICE VISIT (OUTPATIENT)
Dept: SLEEP CENTER | Facility: HOSPITAL | Age: 65
End: 2020-09-01
Payer: COMMERCIAL

## 2020-09-01 VITALS
DIASTOLIC BLOOD PRESSURE: 76 MMHG | HEIGHT: 65 IN | HEART RATE: 76 BPM | SYSTOLIC BLOOD PRESSURE: 116 MMHG | WEIGHT: 150 LBS | BODY MASS INDEX: 24.99 KG/M2

## 2020-09-01 DIAGNOSIS — G47.19 EXCESSIVE DAYTIME SLEEPINESS: ICD-10-CM

## 2020-09-01 DIAGNOSIS — J30.1 NON-SEASONAL ALLERGIC RHINITIS DUE TO POLLEN: ICD-10-CM

## 2020-09-01 DIAGNOSIS — I10 BENIGN ESSENTIAL HYPERTENSION: ICD-10-CM

## 2020-09-01 DIAGNOSIS — R68.2 DRY MOUTH: ICD-10-CM

## 2020-09-01 DIAGNOSIS — K21.00 GASTROESOPHAGEAL REFLUX DISEASE WITH ESOPHAGITIS: ICD-10-CM

## 2020-09-01 DIAGNOSIS — G47.33 OSA (OBSTRUCTIVE SLEEP APNEA): Primary | ICD-10-CM

## 2020-09-01 DIAGNOSIS — F45.8 BRUXISM: ICD-10-CM

## 2020-09-01 PROCEDURE — 3074F SYST BP LT 130 MM HG: CPT | Performed by: INTERNAL MEDICINE

## 2020-09-01 PROCEDURE — 1036F TOBACCO NON-USER: CPT | Performed by: INTERNAL MEDICINE

## 2020-09-01 PROCEDURE — 3078F DIAST BP <80 MM HG: CPT | Performed by: INTERNAL MEDICINE

## 2020-09-01 PROCEDURE — 99214 OFFICE O/P EST MOD 30 MIN: CPT | Performed by: INTERNAL MEDICINE

## 2020-09-01 NOTE — PROGRESS NOTES
Follow-Up Note - Sleep Stephanie [address]  59 y o  female  :1955  AMR:[id number]    CC: I saw this patient for follow-up in clinic today for Sleep disordered breathing, Coexisting Sleep and Medical Problems  Home sleep testing in   demonstrated : MAARI (respiratory event index of) 5 5 /hour  Minimum oxygen saturation was was 89%  The snore index was 70 9%  PFSH, Problem List, Medications & Allergies were reviewed in EMR  Interval changes: none reported  She  has a past medical history of Colon polyp, Disease of thyroid gland, Gallstones, GI (gastrointestinal bleed), History of bilateral oophorectomy, Hyperlipidemia, Hypertension, Iron (Fe) deficiency anemia, Osteopenia, and Renal disorder  She has a current medication list which includes the following prescription(s): dexlansoprazole, famotidine, fexofenadine, levothyroxine, losartan, prednisone, and promethazine-codeine  ROS: constitutional, psychiatric, ENT, respiratory,CVS, GI, UGS, CNS, MSK, integumentary, endocrine, hematological reviewed  DATA REVIEWED:  using PAP > 4 hours/night 73% of the time  Estimated AMARI 0 9/hour at pressure of 8 8cm H2O @90th percentile  SUBJECTIVE: Regarding use of PAP, Prema Bergman reports:   · She is experiencing some adverse effects: dry mouth/throat and mask leaks   · She is   benefiting from use: sleeping better and less aches and pains  · She is not aware of teeth grinding during sleep   Sleep Routine: She reports getting 6-7 hrs sleep  ; she has no difficulty initiating or maintaining sleep   She awakens with aid of an alarm and feels sleep is more refreshing since on Rx  She denied excessive drowsiness   She rated herself at Total score: 5 /24 on the Coffey sleepiness scale  Habits: reports that she quit smoking about 6 years ago  Her smoking use included cigarettes   She has quit using smokeless tobacco ,  reports current alcohol use ,  reports no history of drug use , Caffeine use: limited , Exercise routine: regular    EXAM: /76   Pulse 76   Ht 5' 5" (1 651 m)   Wt 68 kg (150 lb)   BMI 24 96 kg/m²     Patient is well groomed; well appearing  Skin/Extrem: warm & dry; col & hydration normal; no edema  Psych: cooperativeand in no distress  Mental state appears normal   CNS: Alert, orientated, clear & coherent speech  H&N: EOMI; NC/AT:no facial pressure marks, no rashes  ENMT Mucus membranes appear normal Nasal airway:patent  Oral airway:  crowded  Resp:effort is normal CVS: RRR ABD:truncal obesity MSK:Gait normal     IMPRESSION: Primary Sleep/Secondary(to Medical or Psych conditions) & comorbidities   1  DAYANA (obstructive sleep apnea)  PAP DME Resupply/Reorder    PAP DME Pressure Change   2  Dry mouth     3  Bruxism     4  Excessive daytime sleepiness      Resolved   5  Benign essential hypertension     6  Gastroesophageal reflux disease with esophagitis     7  Non-seasonal allergic rhinitis due to pollen         PLAN:  1  Treatment with  PAP is medically necessary and Meryle Broker is agreable to continue use  2  Care of equipment, methods to improve comfort using PAP and importance of compliance with therapy were discussed  3  Pressure setting: change 6-10 cmH2O     4  Rx provided to replace supplies and Care coordinated with DME provider  5  Discussed  strategies for weight reduction  6  Also advised allowing sufficient opportunity for sleep  7  Follow-up is advised in 1 year or sooner if needed to monitor progress, compliance and to adjust therapy  Thank you for allowing me to participate in the care of this patient      Sincerely,    Authenticated electronically by Karely Hay MD on 28[birth year]   Board Certified Specialist

## 2020-09-01 NOTE — PROGRESS NOTES
Review of Systems      Genitourinary need to urinate more than twice a night   Cardiology none   Gastrointestinal frequent heartburn/acid reflux   Neurology none   Constitutional fatigue and weight change   Integumentary none   Psychiatry none   Musculoskeletal none   Pulmonary shortness of breath with activity and chest tightness   ENT none   Endocrine excessive thirst   Hematological none

## 2020-09-01 NOTE — PATIENT INSTRUCTIONS

## 2020-09-02 ENCOUNTER — TELEPHONE (OUTPATIENT)
Dept: SLEEP CENTER | Facility: CLINIC | Age: 65
End: 2020-09-02

## 2020-09-02 NOTE — TELEPHONE ENCOUNTER
[employer] representative aware of DME pressure change      DME resupply order faxed to [facility]

## 2020-09-02 NOTE — TELEPHONE ENCOUNTER
Received a pressure change   Changed accordingly  Patient's now on an APAP 6-10cm  Patient was informed

## 2020-09-04 ENCOUNTER — IMMUNIZATIONS (OUTPATIENT)
Dept: FAMILY MEDICINE CLINIC | Facility: CLINIC | Age: 65
End: 2020-09-04
Payer: COMMERCIAL

## 2020-09-04 DIAGNOSIS — Z23 NEED FOR INFLUENZA VACCINATION: ICD-10-CM

## 2020-09-04 DIAGNOSIS — Z23 NEED FOR TDAP VACCINATION: Primary | ICD-10-CM

## 2020-09-04 PROCEDURE — 96372 THER/PROPH/DIAG INJ SC/IM: CPT

## 2020-09-04 PROCEDURE — 90715 TDAP VACCINE 7 YRS/> IM: CPT

## 2020-09-04 PROCEDURE — 90472 IMMUNIZATION ADMIN EACH ADD: CPT

## 2020-09-04 PROCEDURE — 90471 IMMUNIZATION ADMIN: CPT

## 2020-09-04 PROCEDURE — 90682 RIV4 VACC RECOMBINANT DNA IM: CPT

## 2020-11-02 ENCOUNTER — TELEPHONE (OUTPATIENT)
Dept: UROLOGY | Facility: AMBULATORY SURGERY CENTER | Age: 65
End: 2020-11-02

## 2020-12-16 ENCOUNTER — TRANSCRIBE ORDERS (OUTPATIENT)
Dept: ADMINISTRATIVE | Facility: HOSPITAL | Age: 65
End: 2020-12-16

## 2020-12-16 ENCOUNTER — LAB (OUTPATIENT)
Dept: LAB | Facility: HOSPITAL | Age: 65
End: 2020-12-16
Payer: COMMERCIAL

## 2020-12-16 DIAGNOSIS — N13.1 HYDRONEPHROSIS WITH URETERAL STRICTURE, NOT ELSEWHERE CLASSIFIED: ICD-10-CM

## 2020-12-16 DIAGNOSIS — N13.1 HYDRONEPHROSIS WITH URETERAL STRICTURE, NOT ELSEWHERE CLASSIFIED: Primary | ICD-10-CM

## 2020-12-16 LAB
ANION GAP SERPL CALCULATED.3IONS-SCNC: 5 MMOL/L (ref 4–13)
BUN SERPL-MCNC: 20 MG/DL (ref 5–25)
CALCIUM SERPL-MCNC: 9.4 MG/DL (ref 8.3–10.1)
CHLORIDE SERPL-SCNC: 108 MMOL/L (ref 100–108)
CO2 SERPL-SCNC: 25 MMOL/L (ref 21–32)
CREAT SERPL-MCNC: 1 MG/DL (ref 0.6–1.3)
GFR SERPL CREATININE-BSD FRML MDRD: 59 ML/MIN/1.73SQ M
GLUCOSE P FAST SERPL-MCNC: 108 MG/DL (ref 65–99)
POTASSIUM SERPL-SCNC: 3.9 MMOL/L (ref 3.5–5.3)
SODIUM SERPL-SCNC: 138 MMOL/L (ref 136–145)

## 2020-12-16 PROCEDURE — 80048 BASIC METABOLIC PNL TOTAL CA: CPT

## 2020-12-16 PROCEDURE — 36415 COLL VENOUS BLD VENIPUNCTURE: CPT

## 2020-12-23 ENCOUNTER — OFFICE VISIT (OUTPATIENT)
Dept: UROLOGY | Facility: HOSPITAL | Age: 65
End: 2020-12-23
Payer: COMMERCIAL

## 2020-12-23 DIAGNOSIS — N13.30 HYDRONEPHROSIS, UNSPECIFIED HYDRONEPHROSIS TYPE: Primary | ICD-10-CM

## 2020-12-23 PROBLEM — N13.39 OTHER HYDRONEPHROSIS: Status: ACTIVE | Noted: 2020-12-23

## 2020-12-23 PROCEDURE — 99213 OFFICE O/P EST LOW 20 MIN: CPT | Performed by: NURSE PRACTITIONER

## 2021-01-18 DIAGNOSIS — K21.00 GASTROESOPHAGEAL REFLUX DISEASE WITH ESOPHAGITIS: ICD-10-CM

## 2021-01-18 RX ORDER — DEXLANSOPRAZOLE 60 MG/1
60 CAPSULE, DELAYED RELEASE ORAL DAILY
Qty: 90 CAPSULE | Refills: 1 | Status: SHIPPED | OUTPATIENT
Start: 2021-01-18 | End: 2021-08-06 | Stop reason: SDUPTHER

## 2021-01-18 NOTE — TELEPHONE ENCOUNTER
Pt just sched'd appt/can't be seen until March  Pt is out of med/requests Rx of Dexlansoprazole x90 to [facility]  If ques CB# [phone]; adds med works great

## 2021-02-18 DIAGNOSIS — I10 ESSENTIAL HYPERTENSION: ICD-10-CM

## 2021-02-18 RX ORDER — LOSARTAN POTASSIUM 100 MG/1
TABLET ORAL
Qty: 90 TABLET | Refills: 1 | Status: SHIPPED | OUTPATIENT
Start: 2021-02-18 | End: 2021-08-17

## 2021-02-19 ENCOUNTER — HOSPITAL ENCOUNTER (EMERGENCY)
Facility: HOSPITAL | Age: 66
Discharge: HOME | End: 2021-02-19
Attending: EMERGENCY MEDICINE
Payer: COMMERCIAL

## 2021-02-19 ENCOUNTER — TELEPHONE (OUTPATIENT)
Dept: OTHER | Facility: OTHER | Age: 66
End: 2021-02-19

## 2021-02-19 ENCOUNTER — APPOINTMENT (EMERGENCY)
Dept: CT IMAGING | Facility: HOSPITAL | Age: 66
End: 2021-02-19
Payer: COMMERCIAL

## 2021-02-19 VITALS
RESPIRATION RATE: 18 BRPM | TEMPERATURE: 97.8 F | HEIGHT: 60 IN | OXYGEN SATURATION: 94 % | BODY MASS INDEX: 27.48 KG/M2 | WEIGHT: 140 LBS | SYSTOLIC BLOOD PRESSURE: 142 MMHG | HEART RATE: 72 BPM | DIASTOLIC BLOOD PRESSURE: 90 MMHG

## 2021-02-19 DIAGNOSIS — R10.9 ABDOMINAL PAIN: Primary | ICD-10-CM

## 2021-02-19 DIAGNOSIS — S39.011A ABDOMINAL WALL STRAIN: ICD-10-CM

## 2021-02-19 LAB
ALBUMIN SERPL BCP-MCNC: 3.7 G/DL (ref 3.5–5)
ALP SERPL-CCNC: 97 U/L (ref 46–116)
ALT SERPL W P-5'-P-CCNC: 29 U/L (ref 12–78)
ANION GAP SERPL CALCULATED.3IONS-SCNC: 10 MMOL/L (ref 4–13)
AST SERPL W P-5'-P-CCNC: 22 U/L (ref 5–45)
BASOPHILS # BLD AUTO: 0.03 THOUSANDS/ΜL (ref 0–0.1)
BASOPHILS NFR BLD AUTO: 1 % (ref 0–1)
BILIRUB SERPL-MCNC: 0.3 MG/DL (ref 0.2–1)
BILIRUB UR QL STRIP: NEGATIVE
BUN SERPL-MCNC: 14 MG/DL (ref 5–25)
CALCIUM SERPL-MCNC: 8.7 MG/DL (ref 8.3–10.1)
CHLORIDE SERPL-SCNC: 104 MMOL/L (ref 100–108)
CLARITY UR: CLEAR
CO2 SERPL-SCNC: 25 MMOL/L (ref 21–32)
COLOR UR: YELLOW
CREAT SERPL-MCNC: 0.97 MG/DL (ref 0.6–1.3)
EOSINOPHIL # BLD AUTO: 0.12 THOUSAND/ΜL (ref 0–0.61)
EOSINOPHIL NFR BLD AUTO: 4 % (ref 0–6)
ERYTHROCYTE [DISTWIDTH] IN BLOOD BY AUTOMATED COUNT: 14.5 % (ref 11.6–15.1)
GFR SERPL CREATININE-BSD FRML MDRD: 61 ML/MIN/1.73SQ M
GLUCOSE SERPL-MCNC: 96 MG/DL (ref 65–140)
GLUCOSE UR STRIP-MCNC: NEGATIVE MG/DL
HCT VFR BLD AUTO: 38.4 % (ref 34.8–46.1)
HGB BLD-MCNC: 12.1 G/DL (ref 11.5–15.4)
HGB UR QL STRIP.AUTO: NEGATIVE
IMM GRANULOCYTES # BLD AUTO: 0 THOUSAND/UL (ref 0–0.2)
IMM GRANULOCYTES NFR BLD AUTO: 0 % (ref 0–2)
KETONES UR STRIP-MCNC: NEGATIVE MG/DL
LEUKOCYTE ESTERASE UR QL STRIP: NEGATIVE
LIPASE SERPL-CCNC: 215 U/L (ref 73–393)
LYMPHOCYTES # BLD AUTO: 1.34 THOUSANDS/ΜL (ref 0.6–4.47)
LYMPHOCYTES NFR BLD AUTO: 41 % (ref 14–44)
MCH RBC QN AUTO: 28.1 PG (ref 26.8–34.3)
MCHC RBC AUTO-ENTMCNC: 31.5 G/DL (ref 31.4–37.4)
MCV RBC AUTO: 89 FL (ref 82–98)
MONOCYTES # BLD AUTO: 0.42 THOUSAND/ΜL (ref 0.17–1.22)
MONOCYTES NFR BLD AUTO: 13 % (ref 4–12)
NEUTROPHILS # BLD AUTO: 1.32 THOUSANDS/ΜL (ref 1.85–7.62)
NEUTS SEG NFR BLD AUTO: 41 % (ref 43–75)
NITRITE UR QL STRIP: NEGATIVE
PH UR STRIP.AUTO: 7 [PH]
PLATELET # BLD AUTO: 293 THOUSANDS/UL (ref 149–390)
PMV BLD AUTO: 9.5 FL (ref 8.9–12.7)
POTASSIUM SERPL-SCNC: 4.1 MMOL/L (ref 3.5–5.3)
PROT SERPL-MCNC: 8.1 G/DL (ref 6.4–8.2)
PROT UR STRIP-MCNC: NEGATIVE MG/DL
RBC # BLD AUTO: 4.31 MILLION/UL (ref 3.81–5.12)
SODIUM SERPL-SCNC: 139 MMOL/L (ref 136–145)
SP GR UR STRIP.AUTO: <=1.005 (ref 1–1.03)
UROBILINOGEN UR QL STRIP.AUTO: 0.2 E.U./DL
WBC # BLD AUTO: 3.23 THOUSAND/UL (ref 4.31–10.16)

## 2021-02-19 PROCEDURE — 81003 URINALYSIS AUTO W/O SCOPE: CPT | Performed by: EMERGENCY MEDICINE

## 2021-02-19 PROCEDURE — 80053 COMPREHEN METABOLIC PANEL: CPT | Performed by: EMERGENCY MEDICINE

## 2021-02-19 PROCEDURE — 96374 THER/PROPH/DIAG INJ IV PUSH: CPT

## 2021-02-19 PROCEDURE — 85025 COMPLETE CBC W/AUTO DIFF WBC: CPT | Performed by: EMERGENCY MEDICINE

## 2021-02-19 PROCEDURE — 96361 HYDRATE IV INFUSION ADD-ON: CPT

## 2021-02-19 PROCEDURE — 99284 EMERGENCY DEPT VISIT MOD MDM: CPT

## 2021-02-19 PROCEDURE — 74177 CT ABD & PELVIS W/CONTRAST: CPT

## 2021-02-19 PROCEDURE — 36415 COLL VENOUS BLD VENIPUNCTURE: CPT | Performed by: EMERGENCY MEDICINE

## 2021-02-19 PROCEDURE — 99285 EMERGENCY DEPT VISIT HI MDM: CPT | Performed by: EMERGENCY MEDICINE

## 2021-02-19 PROCEDURE — 83690 ASSAY OF LIPASE: CPT | Performed by: EMERGENCY MEDICINE

## 2021-02-19 PROCEDURE — G1004 CDSM NDSC: HCPCS

## 2021-02-19 RX ORDER — HYDROMORPHONE HCL/PF 1 MG/ML
0.5 SYRINGE (ML) INJECTION ONCE
Status: COMPLETED | OUTPATIENT
Start: 2021-02-19 | End: 2021-02-19

## 2021-02-19 RX ORDER — OXYCODONE HYDROCHLORIDE AND ACETAMINOPHEN 5; 325 MG/1; MG/1
1 TABLET ORAL EVERY 6 HOURS PRN
Qty: 20 TABLET | Refills: 0 | Status: SHIPPED | OUTPATIENT
Start: 2021-02-19 | End: 2022-02-23

## 2021-02-19 RX ADMIN — HYDROMORPHONE HYDROCHLORIDE 0.5 MG: 1 INJECTION, SOLUTION INTRAMUSCULAR; INTRAVENOUS; SUBCUTANEOUS at 09:46

## 2021-02-19 RX ADMIN — IOHEXOL 100 ML: 350 INJECTION, SOLUTION INTRAVENOUS at 10:38

## 2021-02-19 RX ADMIN — SODIUM CHLORIDE 1000 ML: 0.9 INJECTION, SOLUTION INTRAVENOUS at 09:46

## 2021-02-19 NOTE — ED PROVIDER NOTES
History  Chief Complaint   Patient presents with    Groin Pain     patient presents to the ED with c/o left groin/abdominal pain since yesterday, states it travels around to the left back      This is a 59-year-old female presents with sharp left lower quadrant pain that radiates into the left low back area since yesterday denies any nausea vomiting diarrhea no fevers chills or urinary symptoms  History provided by:  Patient and spouse  Medical Problem  Location:  Left lower quadrant  Quality:  Sharp pain  Severity:  Moderate  Onset quality:  Gradual  Duration:  1 day  Timing:  Constant  Progression:  Worsening  Chronicity:  New  Context:  Left lower quadrant pain increasing since yesterday  Relieved by:  Nothing  Worsened by:  Palpation  Associated symptoms: abdominal pain        Prior to Admission Medications   Prescriptions Last Dose Informant Patient Reported?  Taking?   dexlansoprazole (DEXILANT) 60 MG capsule   No No   Sig: Take 1 capsule (60 mg total) by mouth daily   famotidine (PEPCID) 40 MG tablet  Self No No   Sig: Take 1 tablet (40 mg total) by mouth daily   fexofenadine (ALLEGRA) 180 MG tablet  Self Yes No   Sig: Take by mouth   levothyroxine 50 mcg tablet   No No   Sig: TAKE 1 TABLET DAILY   losartan (COZAAR) 100 MG tablet   No No   Sig: TAKE 1 TABLET DAILY   predniSONE 20 mg tablet  Self No No   Sig: TAKE 1 TABLET BID X 5 DAYS THEN TAKE 1 TABLET QD FOR 5 DAYS   Patient not taking: Reported on 6/10/2020   promethazine-codeine (PHENERGAN WITH CODEINE) 6 25-10 mg/5 mL syrup  Self No No   Sig: Take 5 mL by mouth every 4 (four) hours as needed for cough      Facility-Administered Medications: None       Past Medical History:   Diagnosis Date    Colon polyp     Disease of thyroid gland     Gallstones     GI (gastrointestinal bleed)     History of bilateral oophorectomy     Hyperlipidemia     Hypertension     Iron (Fe) deficiency anemia     Osteopenia     Renal disorder        Past Surgical History:   Procedure Laterality Date    APPENDECTOMY      BILATERAL SALPINGOOPHORECTOMY      JOINT REPLACEMENT      KNEE SURGERY      NO PAST SURGERIES      REPLACEMENT TOTAL KNEE BILATERAL         Family History   Problem Relation Age of Onset    Liver cancer Mother     Hypertension Mother     Diabetes Mother     Liver disease Mother     Hypertension Father     Inflammatory bowel disease Father     Diabetes Paternal Grandfather     Substance Abuse Neg Hx     Mental illness Neg Hx      I have reviewed and agree with the history as documented  E-Cigarette/Vaping    E-Cigarette Use Never User      E-Cigarette/Vaping Substances    Nicotine No     THC No     CBD No     Flavoring No     Other No     Unknown No      Social History     Tobacco Use    Smoking status: Former Smoker     Types: Cigarettes     Quit date: 2013     Years since quittin 3    Smokeless tobacco: Former User   Substance Use Topics    Alcohol use: Yes     Frequency: Monthly or less     Drinks per session: 1 or 2     Binge frequency: Never     Comment: social    Drug use: No       Review of Systems   Gastrointestinal: Positive for abdominal pain  All other systems reviewed and are negative  Physical Exam  Physical Exam  Vitals signs and nursing note reviewed  Constitutional:       General: She is not in acute distress  Appearance: She is not ill-appearing, toxic-appearing or diaphoretic  HENT:      Head: Normocephalic and atraumatic  Right Ear: Tympanic membrane and external ear normal       Left Ear: Tympanic membrane and external ear normal       Nose: Nose normal    Eyes:      General: No scleral icterus  Right eye: No discharge  Left eye: No discharge  Extraocular Movements: Extraocular movements intact  Pupils: Pupils are equal, round, and reactive to light  Neck:      Musculoskeletal: Normal range of motion and neck supple  No neck rigidity or muscular tenderness  Cardiovascular:      Rate and Rhythm: Normal rate and regular rhythm  Pulses: Normal pulses  Heart sounds: Normal heart sounds  No murmur  No friction rub  No gallop  Pulmonary:      Effort: Pulmonary effort is normal  No respiratory distress  Breath sounds: Normal breath sounds  No stridor  No wheezing, rhonchi or rales  Abdominal:      General: Abdomen is flat  Bowel sounds are normal  There is no distension  Tenderness: There is abdominal tenderness (Left lower quadrant)  There is guarding  There is no rebound  Musculoskeletal: Normal range of motion  General: No swelling, tenderness, deformity or signs of injury  Right lower leg: No edema  Left lower leg: No edema  Skin:     General: Skin is warm and dry  Findings: No rash  Neurological:      General: No focal deficit present  Mental Status: She is alert and oriented to person, place, and time  Cranial Nerves: No cranial nerve deficit  Sensory: No sensory deficit  Coordination: Coordination normal    Psychiatric:         Mood and Affect: Mood normal          Behavior: Behavior normal          Thought Content:  Thought content normal          Vital Signs  ED Triage Vitals [02/19/21 0925]   Temperature Pulse Respirations Blood Pressure SpO2   97 8 °F (36 6 °C) 85 20 158/95 98 %      Temp Source Heart Rate Source Patient Position - Orthostatic VS BP Location FiO2 (%)   Temporal Monitor -- -- --      Pain Score       Worst Possible Pain           Vitals:    02/19/21 0925 02/19/21 1000   BP: 158/95 142/90   Pulse: 85 72         Visual Acuity      ED Medications  Medications   sodium chloride 0 9 % bolus 1,000 mL (1,000 mL Intravenous New Bag 2/19/21 0946)   HYDROmorphone (DILAUDID) injection 0 5 mg (0 5 mg Intravenous Given 2/19/21 0946)   iohexol (OMNIPAQUE) 350 MG/ML injection (SINGLE-DOSE) 100 mL (100 mL Intravenous Given 2/19/21 1038)       Diagnostic Studies  Results Reviewed Procedure Component Value Units Date/Time    UA w Reflex to Microscopic w Reflex to Culture [[id number]] Collected: 02/19/21 1149    Lab Status: Final result Specimen: Urine, Clean Catch Updated: 02/19/21 1200     Color, UA Yellow     Clarity, UA Clear     Specific Gravity, UA <=1 005     pH, UA 7 0     Leukocytes, UA Negative     Nitrite, UA Negative     Protein, UA Negative mg/dl      Glucose, UA Negative mg/dl      Ketones, UA Negative mg/dl      Urobilinogen, UA 0 2 E U /dl      Bilirubin, UA Negative     Blood, UA Negative    Comprehensive metabolic panel [[id number]] Collected: 02/19/21 0932    Lab Status: Final result Specimen: Blood from Arm, Right Updated: 02/19/21 0954     Sodium 139 mmol/L      Potassium 4 1 mmol/L      Chloride 104 mmol/L      CO2 25 mmol/L      ANION GAP 10 mmol/L      BUN 14 mg/dL      Creatinine 0 97 mg/dL      Glucose 96 mg/dL      Calcium 8 7 mg/dL      AST 22 U/L      ALT 29 U/L      Alkaline Phosphatase 97 U/L      Total Protein 8 1 g/dL      Albumin 3 7 g/dL      Total Bilirubin 0 30 mg/dL      eGFR 61 ml/min/1 73sq m     Narrative:      [place] guidelines for Chronic Kidney Disease (CKD):     Stage 1 with normal or high GFR (GFR > 90 mL/min/1 73 square meters)    Stage 2 Mild CKD (GFR = 60-89 mL/min/1 73 square meters)    Stage 3A Moderate CKD (GFR = 45-59 mL/min/1 73 square meters)    Stage 3B Moderate CKD (GFR = 30-44 mL/min/1 73 square meters)    Stage 4 Severe CKD (GFR = 15-29 mL/min/1 73 square meters)    Stage 5 End Stage CKD (GFR <15 mL/min/1 73 square meters)  Note: GFR calculation is accurate only with a steady state creatinine    Lipase [[id number]]  (Normal) Collected: 02/19/21 0932    Lab Status: Final result Specimen: Blood from Arm, Right Updated: 02/19/21 0954     Lipase 215 u/L     CBC and differential [[id number]]  (Abnormal) Collected: 02/19/21 0932    Lab Status: Final result Specimen: Blood from Arm, Right Updated: 02/19/21 0941 WBC 3 23 Thousand/uL      RBC 4 31 Million/uL      Hemoglobin 12 1 g/dL      Hematocrit 38 4 %      MCV 89 fL      MCH 28 1 pg      MCHC 31 5 g/dL      RDW 14 5 %      MPV 9 5 fL      Platelets 053 Thousands/uL      Neutrophils Relative 41 %      Immat GRANS % 0 %      Lymphocytes Relative 41 %      Monocytes Relative 13 %      Eosinophils Relative 4 %      Basophils Relative 1 %      Neutrophils Absolute 1 32 Thousands/µL      Immature Grans Absolute 0 00 Thousand/uL      Lymphocytes Absolute 1 34 Thousands/µL      Monocytes Absolute 0 42 Thousand/µL      Eosinophils Absolute 0 12 Thousand/µL      Basophils Absolute 0 03 Thousands/µL                  CT abdomen pelvis with contrast   Final Result by Vilma Lombardo MD (02/19 1116)      1  Extensive sigmoid diverticulosis but no evidence of bowel obstruction or acute inflammatory change   2  Chronic severe right-sided hydronephrosis  The left kidney and collecting system are unremarkable  Urinary bladder unremarkable   3  Moderately large hiatal hernia            Workstation performed: [id number]                    Procedures  Procedures         ED Course  ED Course as of Feb 19 1223   Fri Feb 19, 2021   1144  does state the patient was doing a lot of shoveling yesterday and may have strained her muscles                                SBIRT 22yo+      Most Recent Value   SBIRT (23 yo +)   In order to provide better care to our patients, we are screening all of our patients for alcohol and drug use  Would it be okay to ask you these screening questions? Yes Filed at: 02/19/2021 0941   Initial Alcohol Screen: US AUDIT-C    1  How often do you have a drink containing alcohol?  0 Filed at: 02/19/2021 0941   2  How many drinks containing alcohol do you have on a typical day you are drinking? 0 Filed at: 02/19/2021 0941   3a  Male UNDER 65: How often do you have five or more drinks on one occasion? 0 Filed at: 02/19/2021 0941   3b   FEMALE Any Age, or MALE 65+: How often do you have 4 or more drinks on one occassion? 0 Filed at: 02/19/2021 0941   Audit-C Score  0 Filed at: 02/19/2021 9909   BENNY: How many times in the past year have you    Used an illegal drug or used a prescription medication for non-medical reasons? Never Filed at: 02/19/2021 0941                    MDM  Number of Diagnoses or Management Options  Diagnosis management comments: Left lower quadrant pain differential includes diverticulitis versus renal colic or internal hernia will order CT scan and labs for further evaluation treat symptomatically       Amount and/or Complexity of Data Reviewed  Clinical lab tests: ordered  Tests in the radiology section of CPT®: ordered        Disposition  Final diagnoses:   Abdominal pain   Abdominal wall strain     Time reflects when diagnosis was documented in both MDM as applicable and the Disposition within this note     Time User Action Codes Description Comment    2/19/2021 12:20 PM Ricklyne Rape Add [R10 9] Abdominal pain     2/19/2021 12:20 PM Ricklyne Rape Add [T15 861R] Abdominal wall strain       ED Disposition     ED Disposition Condition Date/Time Comment    Discharge Stable Fri Feb 19, 2021 12:20 PM Simona Bond discharge to home/self care              Follow-up Information     Follow up With Specialties Details Why Contact Info Additional Information    Jeny August MD [facility]   [address]  [address] [facility] [address]  [address] [address] [phone]        [address] Emergency Department Emergency Medicine  As needed [address] [place],[address] [id number]  [address] [facility] Emergency Department, [address] [place], [place] [address]          Patient's Medications   Discharge Prescriptions    OXYCODONE-ACETAMINOPHEN (PERCOCET) 5-325 MG PER TABLET    Take 1 tablet by mouth every 6 (six) hours as needed for moderate pain for up to 20 dosesMax Daily Amount: 4 tablets       Start Date: 2/19/2021 End Date: --       Order Dose: 1 tablet       Quantity: 20 tablet    Refills: 0     No discharge procedures on file      PDMP Review       Value Time User    PDMP Reviewed  Yes 2/19/2021 12:21 PM Kary Douglas DO          ED Provider  Electronically Signed by           Kary Douglas DO  02/19/21 1464

## 2021-02-23 ENCOUNTER — VBI (OUTPATIENT)
Dept: ADMINISTRATIVE | Facility: OTHER | Age: 66
End: 2021-02-23

## 2021-02-23 NOTE — TELEPHONE ENCOUNTER
Yosef March    ED Visit Information     Ed visit date: 2/19/2021  Diagnosis Description: Groin Pain   In Network? Yes  [place]  Discharge status: Home  Discharged with meds ? Yes  Number of ED visits to date: 1  ED Severity:N/A     Outreach Information    Outreach successful: Yes 1  Date letter mailed:no need   Date Finalized:2/23/2021    Care Coordination    Follow up appointment with pcp: no N/A  Transportation issues ?  No    Value [employer] type: 7 Day Outreach  Called PCP first?: No  Feels able to call PCP for urgent problems ?: Yes  Understands what emergencies can be handled by PCP ?: Yes  Ever any problems getting appointment with PCP for minor emergency/urgency problems?: No  Practice Contacted Patient ?: No  Pt had ED follow up with pcp/staff ?: No    Seen for follow-up out of network ?: No  02/23/2021 08:23 AM Phone ([facility]) Otto Barillas (Self) [phone] (H)    s/w patient and reviewed ED out reach questions         By Lucretia Mayberry MA

## 2021-03-04 DIAGNOSIS — Z23 ENCOUNTER FOR IMMUNIZATION: ICD-10-CM

## 2021-03-09 ENCOUNTER — IMMUNIZATIONS (OUTPATIENT)
Dept: FAMILY MEDICINE CLINIC | Facility: HOSPITAL | Age: 66
End: 2021-03-09

## 2021-03-09 DIAGNOSIS — Z23 ENCOUNTER FOR IMMUNIZATION: Primary | ICD-10-CM

## 2021-03-09 PROCEDURE — 91300 SARS-COV-2 / COVID-19 MRNA VACCINE (PFIZER-BIONTECH) 30 MCG: CPT

## 2021-03-09 PROCEDURE — 0001A SARS-COV-2 / COVID-19 MRNA VACCINE (PFIZER-BIONTECH) 30 MCG: CPT

## 2021-03-10 ENCOUNTER — OFFICE VISIT (OUTPATIENT)
Dept: GASTROENTEROLOGY | Facility: CLINIC | Age: 66
End: 2021-03-10
Payer: COMMERCIAL

## 2021-03-10 VITALS
HEART RATE: 73 BPM | SYSTOLIC BLOOD PRESSURE: 120 MMHG | WEIGHT: 142 LBS | DIASTOLIC BLOOD PRESSURE: 82 MMHG | HEIGHT: 60 IN | BODY MASS INDEX: 27.88 KG/M2

## 2021-03-10 DIAGNOSIS — K44.9 HIATAL HERNIA: Primary | ICD-10-CM

## 2021-03-10 DIAGNOSIS — Z86.010 HISTORY OF COLON POLYPS: ICD-10-CM

## 2021-03-10 DIAGNOSIS — K21.00 GASTROESOPHAGEAL REFLUX DISEASE WITH ESOPHAGITIS WITHOUT HEMORRHAGE: ICD-10-CM

## 2021-03-10 DIAGNOSIS — G47.33 OSA (OBSTRUCTIVE SLEEP APNEA): ICD-10-CM

## 2021-03-10 PROCEDURE — 3074F SYST BP LT 130 MM HG: CPT | Performed by: INTERNAL MEDICINE

## 2021-03-10 PROCEDURE — 3008F BODY MASS INDEX DOCD: CPT | Performed by: INTERNAL MEDICINE

## 2021-03-10 PROCEDURE — 1036F TOBACCO NON-USER: CPT | Performed by: INTERNAL MEDICINE

## 2021-03-10 PROCEDURE — 1160F RVW MEDS BY RX/DR IN RCRD: CPT | Performed by: INTERNAL MEDICINE

## 2021-03-10 PROCEDURE — 99213 OFFICE O/P EST LOW 20 MIN: CPT | Performed by: INTERNAL MEDICINE

## 2021-03-10 NOTE — LETTER
March 11, 2021     Satya Westfall MD  [address] [id number]    Patient: Hemal Leroy   YOB: 1955   Date of Visit: 3/10/2021       Dear Dr Umesh Blanco: Thank you for referring Hemal Leroy to me for evaluation  Below are my notes for this consultation  If you have questions, please do not hesitate to call me  I look forward to following your patient along with you  Sincerely,        Harjeet Mustafa MD        CC: No Recipients  Harjeet Mustafa MD  3/11/2021  2:35 AM  Incomplete  [address] [facility] - Outpatient Follow-up Note  Hemal Leroy 72 y o  female MRN: [id number]  Encounter: [id number]    ASSESSMENT AND PLAN:      1  Hiatal hernia  --Moderately large noted on previous examination  Overall doing quite well however with respect to her reflux on present medical regimen  Does not have to take Tums  Has lost about 10 lb and is doing much better with respect to reflux  Doing better on Dexilant than other proton pump inhibitors    2  History of colon polyps  -- negative colonoscopy in 2019  Has significant diverticular disease  Colonoscopy 5 years prior showed a small polyp  Recall colonoscopy in 2026    3  DAYANA (obstructive sleep apnea)  - improved with weight loss- does not have to use her CPAP as much    4  Gastroesophageal reflux disease with esophagitis without hemorrhage  -- patient with grade A to B esophagitis on endoscopy 2019 - how much symptomatic improvement  Continue present medications      Followup Appointment: 1 year   ______________________________________________________________________    Chief Complaint   Patient presents with    Follow up-hernia     HPI:  The patient returns to the office for follow-up visit  Overall she has negative review her gastroesophageal reflux  Issue we adjusted her medication instead of her taking twice a day PPI we gave her Dexilant daily    She no longer really needs famotidine  Previously she would take Tums very frequently throughout course of the day  It is unusual for her breakthrough symptoms at this time and reviews of Tums has plummeted  Patient has no dysphagia or odynophagia  One has been helpful is that in the last several months she is been on a [employer] plan has lost 10 lb  This is help her sleep apnea as well as her reflux  Patient in 2019 had an upper endoscopy which showed a fairly large hiatal hernia and distal esophagitis LA grade  A to B  Colonoscopy in 2019 showed diverticulosis without any polyps  Five years previously the patient had a small polyp  Patient had a recent fall - last month and she was seen in the ER - and a CT scan was obtiained   This showed diverticulosis and chronic right sided hydronephrosis - patient has a non functioning right kidney      Historical Information   Past Medical History:   Diagnosis Date    Colon polyp     Disease of thyroid gland     Gallstones     GI (gastrointestinal bleed)     History of bilateral oophorectomy     Hyperlipidemia     Hypertension     Iron (Fe) deficiency anemia     Osteopenia     Renal disorder      Past Surgical History:   Procedure Laterality Date    APPENDECTOMY      BILATERAL SALPINGOOPHORECTOMY      JOINT REPLACEMENT      KNEE SURGERY      NO PAST SURGERIES      REPLACEMENT TOTAL KNEE BILATERAL       Social History     Substance and Sexual Activity   Alcohol Use Yes    Frequency: Monthly or less    Drinks per session: 1 or 2    Binge frequency: Never    Comment: social     Social History     Substance and Sexual Activity   Drug Use No     Social History     Tobacco Use   Smoking Status Former Smoker    Types: Cigarettes    Quit date: 2013    Years since quittin 3   Smokeless Tobacco Former User     Family History   Problem Relation Age of Onset    Liver cancer Mother     Hypertension Mother     Diabetes Mother     Liver disease Mother     Hypertension Father     Inflammatory bowel disease Father     Diabetes Paternal Grandfather     Substance Abuse Neg Hx     Mental illness Neg Hx     Colon polyps Neg Hx     Colon cancer Neg Hx          Current Outpatient Medications:     dexlansoprazole (DEXILANT) 60 MG capsule    famotidine (PEPCID) 40 MG tablet    fexofenadine (ALLEGRA) 180 MG tablet    levothyroxine 50 mcg tablet    losartan (COZAAR) 100 MG tablet    oxyCODONE-acetaminophen (PERCOCET) 5-325 mg per tablet    promethazine-codeine (PHENERGAN WITH CODEINE) 6 25-10 mg/5 mL syrup    predniSONE 20 mg tablet  Allergies   Allergen Reactions    Cat Hair Extract Hives    Pollen Extract Sneezing    Dog Epithelium Allergy Skin Test Cough     Reviewed medications and allergies and updated as indicated    PHYSICAL EXAM:    Blood pressure 120/82, pulse 73, height 5' (1 524 m), weight 64 4 kg (142 lb), not currently breastfeeding  Body mass index is 27 73 kg/m²  General Appearance: NAD, cooperative, alert  Eyes: Anicteric-conjunctiva pink  ENT:  Normocephalic, atraumatic, normal mucosa -normal posterior pharynx    Neck:  Supple, symmetrical, trachea midline  Resp:  Clear to auscultation bilaterally; no rales, rhonchi or wheezing; respirations unlabored   CV:  S1 S2, Regular rate and rhythm; no murmur, rub, or gallop  GI:  Soft, non-tender, non-distended; normal bowel sounds; no masses, no organomegaly   Rectal: Deferred  Musculoskeletal: No cyanosis, clubbing or edema  Normal ROM    Skin:  No jaundice, rashes, or lesions   Heme/Lymph: No palpable cervical lymphadenopathy  Psych: Normal affect, good eye contact  Neuro: No gross deficits, AAOx3    Lab Results:   Lab Results   Component Value Date    WBC 3 23 (L) 02/19/2021    HGB 12 1 02/19/2021    HCT 38 4 02/19/2021    MCV 89 02/19/2021     02/19/2021     Lab Results   Component Value Date    K 4 1 02/19/2021     02/19/2021    CO2 25 02/19/2021    BUN 14 02/19/2021    CREATININE 0 97 02/19/2021 GLUF 108 (H) 12/16/2020    CALCIUM 8 7 02/19/2021    AST 22 02/19/2021    ALT 29 02/19/2021    ALKPHOS 97 02/19/2021    EGFR 61 02/19/2021     Lab Results   Component Value Date    IRON 135 12/17/2019    FERRITIN 45 11/03/2018     Lab Results   Component Value Date    LIPASE 215 02/19/2021            Reford Files, MD  3/10/2021  3:08 PM  Sign when Signing Visit  [facility] - Outpatient Follow-up Note  Coleman Sutton 72 y o  female MRN: [id number]  Encounter: [id number]    ASSESSMENT AND PLAN:      1  Hiatal hernia  --Moderately large noted on previous examination  Overall doing quite well however with respect to her reflux on present medical regimen  Does not have to take Tums  Has lost about 10 lb and is doing much better with respect to reflux  Doing better on Dexilant than other proton pump inhibitors    2  History of colon polyps  -- negative colonoscopy in 2019  Has significant diverticular disease  Colonoscopy 5 years prior showed a small polyp  Recall colonoscopy in 2026    3  DAYANA (obstructive sleep apnea)  - improved with weight loss- does not have to use her CPAP as much    4  Gastroesophageal reflux disease with esophagitis without hemorrhage  -- patient with grade A to B esophagitis on endoscopy 2019 - how much symptomatic improvement    Continue present medications      Followup Appointment: 1 year   ______________________________________________________________________    Chief Complaint   Patient presents with    Follow up-hernia     HPI:      Historical Information   Past Medical History:   Diagnosis Date    Colon polyp     Disease of thyroid gland     Gallstones     GI (gastrointestinal bleed)     History of bilateral oophorectomy     Hyperlipidemia     Hypertension     Iron (Fe) deficiency anemia     Osteopenia     Renal disorder      Past Surgical History:   Procedure Laterality Date    APPENDECTOMY      BILATERAL SALPINGOOPHORECTOMY  JOINT REPLACEMENT      KNEE SURGERY      NO PAST SURGERIES      REPLACEMENT TOTAL KNEE BILATERAL       Social History     Substance and Sexual Activity   Alcohol Use Yes    Frequency: Monthly or less    Drinks per session: 1 or 2    Binge frequency: Never    Comment: social     Social History     Substance and Sexual Activity   Drug Use No     Social History     Tobacco Use   Smoking Status Former Smoker    Types: Cigarettes    Quit date: 2013    Years since quittin 3   Smokeless Tobacco Former User     Family History   Problem Relation Age of Onset    Liver cancer Mother     Hypertension Mother     Diabetes Mother     Liver disease Mother     Hypertension Father     Inflammatory bowel disease Father     Diabetes Paternal Grandfather     Substance Abuse Neg Hx     Mental illness Neg Hx     Colon polyps Neg Hx     Colon cancer Neg Hx          Current Outpatient Medications:     dexlansoprazole (DEXILANT) 60 MG capsule    famotidine (PEPCID) 40 MG tablet    fexofenadine (ALLEGRA) 180 MG tablet    levothyroxine 50 mcg tablet    losartan (COZAAR) 100 MG tablet    oxyCODONE-acetaminophen (PERCOCET) 5-325 mg per tablet    promethazine-codeine (PHENERGAN WITH CODEINE) 6 25-10 mg/5 mL syrup    predniSONE 20 mg tablet  Allergies   Allergen Reactions    Cat Hair Extract Hives    Pollen Extract Sneezing    Dog Epithelium Allergy Skin Test Cough     Reviewed medications and allergies and updated as indicated    PHYSICAL EXAM:    Blood pressure 120/82, pulse 73, height 5' (1 524 m), weight 64 4 kg (142 lb), not currently breastfeeding  Body mass index is 27 73 kg/m²  General Appearance: NAD, cooperative, alert  Eyes: Anicteric, PERRLA, EOMI  ENT:  Normocephalic, atraumatic, normal mucosa      Neck:  Supple, symmetrical, trachea midline  Resp:  Clear to auscultation bilaterally; no rales, rhonchi or wheezing; respirations unlabored   CV:  S1 S2, Regular rate and rhythm; no murmur, rub, or gallop  GI:  Soft, non-tender, non-distended; normal bowel sounds; no masses, no organomegaly   Rectal: Deferred  Musculoskeletal: No cyanosis, clubbing or edema  Normal ROM  Skin:  No jaundice, rashes, or lesions   Heme/Lymph: No palpable cervical lymphadenopathy  Psych: Normal affect, good eye contact  Neuro: No gross deficits, AAOx3    Lab Results:   Lab Results   Component Value Date    WBC 3 23 (L) 02/19/2021    HGB 12 1 02/19/2021    HCT 38 4 02/19/2021    MCV 89 02/19/2021     02/19/2021     Lab Results   Component Value Date    K 4 1 02/19/2021     02/19/2021    CO2 25 02/19/2021    BUN 14 02/19/2021    CREATININE 0 97 02/19/2021    GLUF 108 (H) 12/16/2020    CALCIUM 8 7 02/19/2021    AST 22 02/19/2021    ALT 29 02/19/2021    ALKPHOS 97 02/19/2021    EGFR 61 02/19/2021     Lab Results   Component Value Date    IRON 135 12/17/2019    FERRITIN 45 11/03/2018     Lab Results   Component Value Date    LIPASE 215 02/19/2021       Radiology Results:   Ct Abdomen Pelvis With Contrast    Result Date: 2/19/2021  Narrative: CT ABDOMEN AND PELVIS WITH IV CONTRAST INDICATION:   Left lower quadrant abdominal pain  Left groin pain  Radiates to the back    COMPARISON:  1/13/2019 TECHNIQUE:  CT examination of the abdomen and pelvis was performed  Axial, sagittal, and coronal 2D reformatted images were created from the source data and submitted for interpretation  Radiation dose length product (DLP) for this visit:  445 79 mGy-cm   This examination, like all CT scans performed in the [facility], was performed utilizing techniques to minimize radiation dose exposure, including the use of iterative  reconstruction and automated exposure control  IV Contrast:  100 mL of iohexol (OMNIPAQUE) Enteric Contrast:  Enteric contrast was not administered  FINDINGS: ABDOMEN LOWER CHEST:  Moderately large hiatal hernia  LIVER/BILIARY TREE:  Unremarkable  GALLBLADDER:  No calcified gallstones   No pericholecystic inflammatory change  SPLEEN:  Unremarkable  PANCREAS:  Unremarkable  ADRENAL GLANDS:  Unremarkable  KIDNEYS/URETERS:  severe right-sided hydronephrosis with markedly thinned right renal cortex  There is some enhancement of the residual cortical tissue but no contrast accumulation within the collecting system  Left kidney is unremarkable  STOMACH AND BOWEL:  Rather extensive sigmoid and to a lesser extent descending colonic diverticulosis but no evidence of acute diverticulitis  No bowel obstruction  APPENDIX:  No findings to suggest appendicitis  ABDOMINOPELVIC CAVITY:  No ascites  No pneumoperitoneum  No lymphadenopathy  VESSELS:  Unremarkable for patient's age  PELVIS REPRODUCTIVE ORGANS:  Unremarkable for patient's age  URINARY BLADDER:  Unremarkable  ABDOMINAL WALL/INGUINAL REGIONS:  Very small adipose containing umbilical hernia  OSSEOUS STRUCTURES:  No acute fracture or destructive osseous lesion  Impression: 1  Extensive sigmoid diverticulosis but no evidence of bowel obstruction or acute inflammatory change 2  Chronic severe right-sided hydronephrosis  The left kidney and collecting system are unremarkable  Urinary bladder unremarkable 3   Moderately large hiatal hernia Workstation performed: [id number]

## 2021-03-10 NOTE — PATIENT INSTRUCTIONS
[address] [facility] - Outpatient Follow-up Note  Edith Simmons 72 y o  female MRN: [id number]  Encounter: [id number]    ASSESSMENT AND PLAN:      1  Hiatal hernia  --Moderately large noted on previous examination  Overall doing quite well however with respect to her reflux on present medical regimen  Does not have to take Tums  Has lost about 10 lb and is doing much better with respect to reflux  Doing better on Dexilant than other proton pump inhibitors    2  History of colon polyps  -- negative colonoscopy in 2019  Has significant diverticular disease  Colonoscopy 5 years prior showed a small polyp  Recall colonoscopy in 2026    3  DAYANA (obstructive sleep apnea)  - improved with weight loss- does not have to use her CPAP as much    4  Gastroesophageal reflux disease with esophagitis without hemorrhage  -- patient with grade A to B esophagitis on endoscopy 2019 - how much symptomatic improvement    Continue present medications      Followup Appointment: 1 year

## 2021-03-10 NOTE — PROGRESS NOTES
[address] [facility] [facility] - Outpatient Follow-up Note  Gissel Car 72 y o  female MRN: [id number]  Encounter: [id number]    ASSESSMENT AND PLAN:      1  Hiatal hernia  --Moderately large noted on previous examination  Overall doing quite well however with respect to her reflux on present medical regimen  Does not have to take Tums  Has lost about 10 lb and is doing much better with respect to reflux  Doing better on Dexilant than other proton pump inhibitors    2  History of colon polyps  -- negative colonoscopy in 2019  Has significant diverticular disease  Colonoscopy 5 years prior showed a small polyp  Recall colonoscopy in 2026    3  DAYANA (obstructive sleep apnea)  - improved with weight loss- does not have to use her CPAP as much    4  Gastroesophageal reflux disease with esophagitis without hemorrhage  -- patient with grade A to B esophagitis on endoscopy 2019 - how much symptomatic improvement  Continue present medications      Followup Appointment: 1 year   ______________________________________________________________________    Chief Complaint   Patient presents with    Follow up-hernia     HPI:  The patient returns to the office for follow-up visit  Overall she has negative review her gastroesophageal reflux  Issue we adjusted her medication instead of her taking twice a day PPI we gave her Dexilant daily  She no longer really needs famotidine  Previously she would take Tums very frequently throughout course of the day  It is unusual for her breakthrough symptoms at this time and reviews of Tums has plummeted  Patient has no dysphagia or odynophagia  One has been helpful is that in the last several months she is been on a [employer] plan has lost 10 lb  This is help her sleep apnea as well as her reflux  Patient in 2019 had an upper endoscopy which showed a fairly large hiatal hernia and distal esophagitis LA grade  A to B    Colonoscopy in 2019 showed diverticulosis without any polyps  Five years previously the patient had a small polyp  Patient had a recent fall - last month and she was seen in the ER - and a CT scan was obtiained   This showed diverticulosis and chronic right sided hydronephrosis - patient has a non functioning right kidney      Historical Information   Past Medical History:   Diagnosis Date    Colon polyp     Disease of thyroid gland     Gallstones     GI (gastrointestinal bleed)     History of bilateral oophorectomy     Hyperlipidemia     Hypertension     Iron (Fe) deficiency anemia     Osteopenia     Renal disorder      Past Surgical History:   Procedure Laterality Date    APPENDECTOMY      BILATERAL SALPINGOOPHORECTOMY      JOINT REPLACEMENT      KNEE SURGERY      NO PAST SURGERIES      REPLACEMENT TOTAL KNEE BILATERAL       Social History     Substance and Sexual Activity   Alcohol Use Yes    Frequency: Monthly or less    Drinks per session: 1 or 2    Binge frequency: Never    Comment: social     Social History     Substance and Sexual Activity   Drug Use No     Social History     Tobacco Use   Smoking Status Former Smoker    Types: Cigarettes    Quit date: 2013    Years since quittin 3   Smokeless Tobacco Former User     Family History   Problem Relation Age of Onset    Liver cancer Mother     Hypertension Mother     Diabetes Mother     Liver disease Mother     Hypertension Father     Inflammatory bowel disease Father     Diabetes Paternal Grandfather     Substance Abuse Neg Hx     Mental illness Neg Hx     Colon polyps Neg Hx     Colon cancer Neg Hx          Current Outpatient Medications:     dexlansoprazole (DEXILANT) 60 MG capsule    famotidine (PEPCID) 40 MG tablet    fexofenadine (ALLEGRA) 180 MG tablet    levothyroxine 50 mcg tablet    losartan (COZAAR) 100 MG tablet    oxyCODONE-acetaminophen (PERCOCET) 5-325 mg per tablet    promethazine-codeine (PHENERGAN WITH CODEINE) 6 25-10 mg/5 mL syrup    predniSONE 20 mg tablet  Allergies   Allergen Reactions    Cat Hair Extract Hives    Pollen Extract Sneezing    Dog Epithelium Allergy Skin Test Cough     Reviewed medications and allergies and updated as indicated    PHYSICAL EXAM:    Blood pressure 120/82, pulse 73, height 5' (1 524 m), weight 64 4 kg (142 lb), not currently breastfeeding  Body mass index is 27 73 kg/m²  General Appearance: NAD, cooperative, alert  Eyes: Anicteric-conjunctiva pink  ENT:  Normocephalic, atraumatic, normal mucosa -normal posterior pharynx    Neck:  Supple, symmetrical, trachea midline  Resp:  Clear to auscultation bilaterally; no rales, rhonchi or wheezing; respirations unlabored   CV:  S1 S2, Regular rate and rhythm; no murmur, rub, or gallop  GI:  Soft, non-tender, non-distended; normal bowel sounds; no masses, no organomegaly   Rectal: Deferred  Musculoskeletal: No cyanosis, clubbing or edema  Normal ROM    Skin:  No jaundice, rashes, or lesions   Heme/Lymph: No palpable cervical lymphadenopathy  Psych: Normal affect, good eye contact  Neuro: No gross deficits, AAOx3    Lab Results:   Lab Results   Component Value Date    WBC 3 23 (L) 02/19/2021    HGB 12 1 02/19/2021    HCT 38 4 02/19/2021    MCV 89 02/19/2021     02/19/2021     Lab Results   Component Value Date    K 4 1 02/19/2021     02/19/2021    CO2 25 02/19/2021    BUN 14 02/19/2021    CREATININE 0 97 02/19/2021    GLUF 108 (H) 12/16/2020    CALCIUM 8 7 02/19/2021    AST 22 02/19/2021    ALT 29 02/19/2021    ALKPHOS 97 02/19/2021    EGFR 61 02/19/2021     Lab Results   Component Value Date    IRON 135 12/17/2019    FERRITIN 45 11/03/2018     Lab Results   Component Value Date    LIPASE 215 02/19/2021

## 2021-03-31 ENCOUNTER — IMMUNIZATIONS (OUTPATIENT)
Dept: FAMILY MEDICINE CLINIC | Facility: HOSPITAL | Age: 66
End: 2021-03-31

## 2021-03-31 DIAGNOSIS — Z23 ENCOUNTER FOR IMMUNIZATION: Primary | ICD-10-CM

## 2021-03-31 PROCEDURE — 0002A SARS-COV-2 / COVID-19 MRNA VACCINE (PFIZER-BIONTECH) 30 MCG: CPT

## 2021-03-31 PROCEDURE — 91300 SARS-COV-2 / COVID-19 MRNA VACCINE (PFIZER-BIONTECH) 30 MCG: CPT

## 2021-05-20 ENCOUNTER — VBI (OUTPATIENT)
Dept: ADMINISTRATIVE | Facility: OTHER | Age: 66
End: 2021-05-20

## 2021-06-14 ENCOUNTER — HOSPITAL ENCOUNTER (OUTPATIENT)
Dept: ULTRASOUND IMAGING | Facility: HOSPITAL | Age: 66
Discharge: HOME | End: 2021-06-14
Payer: COMMERCIAL

## 2021-06-14 DIAGNOSIS — N13.30 HYDRONEPHROSIS, UNSPECIFIED HYDRONEPHROSIS TYPE: ICD-10-CM

## 2021-06-14 PROCEDURE — 76770 US EXAM ABDO BACK WALL COMP: CPT

## 2021-07-06 NOTE — PROGRESS NOTES
7/7/2021    Monylizz Ponce  1955  [id number]        Assessment  -Right sided hydronephrosis    Discussion/Plan  Natalie Finley is a 72 y o  female being managed by our office    1  Right sided hydronephrosis-   We reviewed the results of her recent renal ultrasound which identified stable, unchanged severe chronic right renal hydronephrosis with atrophy  This is unchanged compared to previous CT scan from 2019  Her last creatinine from February 2021 was 0 97  Patient states she will be moving to [place] in the next few months  She remains asymptomatic  Advised patient that she should continue close observation of her kidney function and we can fax records to her new provider  Patient will call our office with any issues and follow-up as needed     -All questions answered, patients agree with plan     History of Present Illness  72 y o  female with a history of right sided hydronephrosis presents today for follow up  Patient last evaluated in December 2020  She is previously known to Dr Radha Kaur  Patient initially found to have severe right-sided hydronephrosis and atrophy of kidney on CT scan  Renal Lasix scan confirmed no function of right kidney without any obvious obstruction  Dr Hien Doshi had previously discussed right nephrectomy, but patient wished to observe as she had been asymptomatic  She continues to do well without any complaints of renal colic or lower urinary tract symptoms  Patient denies any family history of renal or urologic malignancy  No changes to her overall health  Review of Systems  Review of Systems   Constitutional: Negative  HENT: Negative  Respiratory: Negative  Cardiovascular: Negative  Gastrointestinal: Negative  Genitourinary: Negative for decreased urine volume, difficulty urinating, dysuria, flank pain, frequency, hematuria and urgency  Musculoskeletal: Negative  Skin: Negative  Neurological: Negative      Psychiatric/Behavioral: Negative  Past Medical History  Past Medical History:   Diagnosis Date    Colon polyp     Disease of thyroid gland     Gallstones     GI (gastrointestinal bleed)     History of bilateral oophorectomy     Hyperlipidemia     Hypertension     Iron (Fe) deficiency anemia     Osteopenia     Renal disorder        Past Social History  Past Surgical History:   Procedure Laterality Date    APPENDECTOMY      BILATERAL SALPINGOOPHORECTOMY      JOINT REPLACEMENT      KNEE SURGERY      NO PAST SURGERIES      REPLACEMENT TOTAL KNEE BILATERAL         Past Family History  Family History   Problem Relation Age of Onset    Liver cancer Mother    Mathieu Peter Hypertension Mother     Diabetes Mother     Liver disease Mother     Hypertension Father     Inflammatory bowel disease Father     Diabetes Paternal Grandfather     Substance Abuse Neg Hx     Mental illness Neg Hx     Colon polyps Neg Hx     Colon cancer Neg Hx        Past Social history  Social History     Socioeconomic History    Marital status: /Civil Union     Spouse name: Not on file    Number of children: Not on file    Years of education: Not on file    Highest education level: Not on file   Occupational History    Not on file   Tobacco Use    Smoking status: Former Smoker     Types: Cigarettes     Quit date: 2013     Years since quittin 6    Smokeless tobacco: Former User   Vaping Use    Vaping Use: Never used   Substance and Sexual Activity    Alcohol use: Yes     Comment: social    Drug use: No    Sexual activity: Yes   Other Topics Concern    Not on file   Social History Narrative    Not on file     Social Determinants of Health     Financial Resource Strain:     Difficulty of Paying Living Expenses:    Food Insecurity:     Worried About Running Out of Food in the Last Year:     Ran Out of Food in the Last Year:    Transportation Needs:     Lack of Transportation (Medical):      Lack of Transportation (Non-Medical):    Physical Activity:     Days of Exercise per Week:     Minutes of Exercise per Session:    Stress:     Feeling of Stress :    Social Connections:     Frequency of Communication with Friends and Family:     Frequency of Social Gatherings with Friends and Family:     Attends Gnosticist Services:     Active Member of Clubs or Organizations:     Attends Club or Organization Meetings:     Marital Status:    Intimate Partner Violence:     Fear of Current or Ex-Partner:     Emotionally Abused:     Physically Abused:     Sexually Abused:        Current Medications  Current Outpatient Medications   Medication Sig Dispense Refill    famotidine (PEPCID) 40 MG tablet Take 1 tablet (40 mg total) by mouth daily 90 tablet 3    fexofenadine (ALLEGRA) 180 MG tablet Take by mouth      levothyroxine 50 mcg tablet TAKE 1 TABLET DAILY 90 tablet 3    losartan (COZAAR) 100 MG tablet TAKE 1 TABLET DAILY 90 tablet 1    dexlansoprazole (DEXILANT) 60 MG capsule Take 1 capsule (60 mg total) by mouth daily 90 capsule 1    oxyCODONE-acetaminophen (PERCOCET) 5-325 mg per tablet Take 1 tablet by mouth every 6 (six) hours as needed for moderate pain for up to 20 dosesMax Daily Amount: 4 tablets 20 tablet 0    predniSONE 20 mg tablet TAKE 1 TABLET BID X 5 DAYS THEN TAKE 1 TABLET QD FOR 5 DAYS (Patient not taking: Reported on 6/10/2020) 15 tablet 0    promethazine-codeine (PHENERGAN WITH CODEINE) 6 25-10 mg/5 mL syrup Take 5 mL by mouth every 4 (four) hours as needed for cough (Patient not taking: Reported on 7/7/2021) 240 mL 1     No current facility-administered medications for this visit  Allergies  Allergies   Allergen Reactions    Cat Hair Extract Hives    Pollen Extract Sneezing    Dog Epithelium Allergy Skin Test Cough       Past medical history, social history, family history, medications and allergies were reviewed      Vitals  Vitals:    07/07/21 0837   BP: 118/72   BP Location: Left arm Patient Position: Sitting   Cuff Size: Adult   Pulse: 83   Weight: 61 2 kg (135 lb)   Height: 5' (1 524 m)       Physical Exam  Physical Exam  Constitutional:       Appearance: Normal appearance  She is well-developed  HENT:      Head: Normocephalic  Eyes:      Pupils: Pupils are equal, round, and reactive to light  Pulmonary:      Effort: Pulmonary effort is normal    Abdominal:      Palpations: Abdomen is soft  Tenderness: There is no right CVA tenderness or left CVA tenderness  Musculoskeletal:         General: Normal range of motion  Cervical back: Normal range of motion  Skin:     General: Skin is warm and dry  Neurological:      General: No focal deficit present  Mental Status: She is alert and oriented to person, place, and time  Psychiatric:         Mood and Affect: Mood normal          Behavior: Behavior normal          Thought Content: Thought content normal          Judgment: Judgment normal          Results    I have personally reviewed all pertinent lab results and reviewed with patient  Lab Results   Component Value Date    CALCIUM 8 7 02/19/2021    K 4 1 02/19/2021    CO2 25 02/19/2021     02/19/2021    BUN 14 02/19/2021    CREATININE 0 97 02/19/2021     Lab Results   Component Value Date    WBC 3 23 (L) 02/19/2021    HGB 12 1 02/19/2021    HCT 38 4 02/19/2021    MCV 89 02/19/2021     02/19/2021     No results found for this or any previous visit (from the past 1 hour(s))

## 2021-07-07 ENCOUNTER — OFFICE VISIT (OUTPATIENT)
Dept: UROLOGY | Facility: HOSPITAL | Age: 66
End: 2021-07-07
Payer: COMMERCIAL

## 2021-07-07 VITALS
HEART RATE: 83 BPM | DIASTOLIC BLOOD PRESSURE: 72 MMHG | WEIGHT: 135 LBS | BODY MASS INDEX: 26.5 KG/M2 | HEIGHT: 60 IN | SYSTOLIC BLOOD PRESSURE: 118 MMHG

## 2021-07-07 DIAGNOSIS — N13.30 HYDRONEPHROSIS, UNSPECIFIED HYDRONEPHROSIS TYPE: Primary | ICD-10-CM

## 2021-07-07 PROCEDURE — 1160F RVW MEDS BY RX/DR IN RCRD: CPT | Performed by: NURSE PRACTITIONER

## 2021-07-07 PROCEDURE — 1036F TOBACCO NON-USER: CPT | Performed by: NURSE PRACTITIONER

## 2021-07-07 PROCEDURE — 99213 OFFICE O/P EST LOW 20 MIN: CPT | Performed by: NURSE PRACTITIONER

## 2021-07-07 PROCEDURE — 3008F BODY MASS INDEX DOCD: CPT | Performed by: NURSE PRACTITIONER

## 2021-08-01 DIAGNOSIS — E03.9 HYPOTHYROIDISM, UNSPECIFIED TYPE: ICD-10-CM

## 2021-08-02 RX ORDER — LEVOTHYROXINE SODIUM 0.05 MG/1
TABLET ORAL
Qty: 90 TABLET | Refills: 3 | Status: SHIPPED | OUTPATIENT
Start: 2021-08-02 | End: 2022-02-23 | Stop reason: SDUPTHER

## 2021-08-06 DIAGNOSIS — K21.00 GASTROESOPHAGEAL REFLUX DISEASE WITH ESOPHAGITIS: ICD-10-CM

## 2021-08-06 NOTE — TELEPHONE ENCOUNTER
Nico sullivan from [facility] states they have sent fax/are awaiting response - Dexilant not cov'd; possible alternatives include Omeprazole, Pantoprazole, Lansoprazole, Rabeprazole, or Esomeprazole  Reramana -[phone] using Ref# [id number]; adds this is time sensitive/pls call ASAP

## 2021-08-06 NOTE — TELEPHONE ENCOUNTER
Prior auth in process via [employer] approved 7/7/21-8/6/22  Pharmacy requires new Rx  Please sign off on Rx patient due for annual visit March 2022

## 2021-08-09 RX ORDER — DEXLANSOPRAZOLE 60 MG/1
60 CAPSULE, DELAYED RELEASE ORAL DAILY
Qty: 90 CAPSULE | Refills: 1 | Status: SHIPPED | OUTPATIENT
Start: 2021-08-09 | End: 2021-11-07

## 2021-08-09 NOTE — TELEPHONE ENCOUNTER
I spoke with pharmacy and they require patient to call in to auth fill (has to do with credit card billing)  I called pt, no answer, left msg (identifies as Braulio Oliver) to call pharmacy to authorize  Please sign off on Rx [facility] not available

## 2021-08-17 DIAGNOSIS — I10 ESSENTIAL HYPERTENSION: ICD-10-CM

## 2021-08-17 RX ORDER — LOSARTAN POTASSIUM 100 MG/1
TABLET ORAL
Qty: 90 TABLET | Refills: 3 | Status: SHIPPED | OUTPATIENT
Start: 2021-08-17 | End: 2022-02-23 | Stop reason: SDUPTHER

## 2022-02-23 ENCOUNTER — OFFICE VISIT (OUTPATIENT)
Dept: FAMILY MEDICINE CLINIC | Facility: CLINIC | Age: 67
End: 2022-02-23
Payer: MEDICARE

## 2022-02-23 VITALS
HEIGHT: 61 IN | WEIGHT: 140.6 LBS | TEMPERATURE: 97.6 F | BODY MASS INDEX: 26.55 KG/M2 | HEART RATE: 72 BPM | SYSTOLIC BLOOD PRESSURE: 132 MMHG | OXYGEN SATURATION: 98 % | DIASTOLIC BLOOD PRESSURE: 86 MMHG

## 2022-02-23 DIAGNOSIS — I10 ESSENTIAL HYPERTENSION: ICD-10-CM

## 2022-02-23 DIAGNOSIS — E78.2 MIXED HYPERLIPIDEMIA: ICD-10-CM

## 2022-02-23 DIAGNOSIS — E06.3 HYPOTHYROIDISM DUE TO HASHIMOTO'S THYROIDITIS: ICD-10-CM

## 2022-02-23 DIAGNOSIS — Z00.00 WELCOME TO MEDICARE PREVENTIVE VISIT: Primary | ICD-10-CM

## 2022-02-23 DIAGNOSIS — E03.9 HYPOTHYROIDISM, UNSPECIFIED TYPE: ICD-10-CM

## 2022-02-23 DIAGNOSIS — I10 BENIGN ESSENTIAL HYPERTENSION: ICD-10-CM

## 2022-02-23 DIAGNOSIS — K21.00 GASTROESOPHAGEAL REFLUX DISEASE WITH ESOPHAGITIS: ICD-10-CM

## 2022-02-23 DIAGNOSIS — E03.8 HYPOTHYROIDISM DUE TO HASHIMOTO'S THYROIDITIS: ICD-10-CM

## 2022-02-23 PROCEDURE — G0439 PPPS, SUBSEQ VISIT: HCPCS | Performed by: FAMILY MEDICINE

## 2022-02-23 PROCEDURE — 99214 OFFICE O/P EST MOD 30 MIN: CPT | Performed by: FAMILY MEDICINE

## 2022-02-23 PROCEDURE — 1123F ACP DISCUSS/DSCN MKR DOCD: CPT | Performed by: FAMILY MEDICINE

## 2022-02-23 RX ORDER — LEVOTHYROXINE SODIUM 0.05 MG/1
50 TABLET ORAL DAILY
Qty: 90 TABLET | Refills: 3 | Status: SHIPPED | OUTPATIENT
Start: 2022-02-23

## 2022-02-23 RX ORDER — FAMOTIDINE 40 MG/1
40 TABLET, FILM COATED ORAL DAILY
Qty: 90 TABLET | Refills: 3 | Status: SHIPPED | OUTPATIENT
Start: 2022-02-23

## 2022-02-23 RX ORDER — LOSARTAN POTASSIUM 100 MG/1
100 TABLET ORAL DAILY
Qty: 90 TABLET | Refills: 3 | Status: SHIPPED | OUTPATIENT
Start: 2022-02-23

## 2022-02-23 NOTE — PROGRESS NOTES
Assessment and Plan:     Problem List Items Addressed This Visit        Endocrine    Hypothyroid    Relevant Medications    levothyroxine 50 mcg tablet    Other Relevant Orders    Comprehensive metabolic panel    Lipid Panel with Direct LDL reflex    T4, free    TSH, 3rd generation with Free T4 reflex       Cardiovascular and Mediastinum    Benign essential hypertension    Relevant Medications    losartan (COZAAR) 100 MG tablet       Other    Mixed hyperlipidemia    Relevant Orders    Comprehensive metabolic panel    Lipid Panel with Direct LDL reflex    T4, free    TSH, 3rd generation with Free T4 reflex      Other Visit Diagnoses     Welcome to Medicare preventive visit    -  Primary    Gastroesophageal reflux disease with esophagitis        Relevant Medications    famotidine (PEPCID) 40 MG tablet    Essential hypertension        Relevant Medications    losartan (COZAAR) 100 MG tablet        BMI Counseling: Body mass index is 26 57 kg/m²  The BMI is above normal  Nutrition recommendations include decreasing portion sizes  Exercise recommendations include moderate physical activity 150 minutes/week  No pharmacotherapy was ordered  Rationale for BMI follow-up plan is due to patient being overweight or obese  Depression Screening and Follow-up Plan: Patient was screened for depression during today's encounter  They screened negative with a PHQ-2 score of 0  Preventive health issues were discussed with patient, and age appropriate screening tests were ordered as noted in patient's After Visit Summary  Personalized health advice and appropriate referrals for health education or preventive services given if needed, as noted in patient's After Visit Summary       History of Present Illness:     Patient presents for Medicare Annual Wellness visit    Patient Care Team:  Elenita Yang MD as PCP - General     Problem List:     Patient Active Problem List   Diagnosis    Allergic rhinitis    Benign essential hypertension    Bunion of great toe of right foot    Esophageal reflux    Impaired fasting glucose    Mixed hyperlipidemia    Osteopenia    Hydronephrosis    Hypothyroid    Hiatal hernia    DAYANA (obstructive sleep apnea)    Other hydronephrosis      Past Medical and Surgical History:     Past Medical History:   Diagnosis Date    Colon polyp     Disease of thyroid gland     Gallstones     GI (gastrointestinal bleed)     History of bilateral oophorectomy     Hyperlipidemia     Hypertension     Iron (Fe) deficiency anemia     Osteopenia     Renal disorder      Past Surgical History:   Procedure Laterality Date    APPENDECTOMY      BILATERAL SALPINGOOPHORECTOMY      JOINT REPLACEMENT      KNEE SURGERY      NO PAST SURGERIES      REPLACEMENT TOTAL KNEE BILATERAL        Family History:     Family History   Problem Relation Age of Onset    Liver cancer Mother     Hypertension Mother     Diabetes Mother     Liver disease Mother     Hypertension Father     Inflammatory bowel disease Father     Diabetes Paternal Grandfather     Substance Abuse Neg Hx     Mental illness Neg Hx     Colon polyps Neg Hx     Colon cancer Neg Hx       Social History:     Social History     Socioeconomic History    Marital status: /Civil Union     Spouse name: Willie Andujar Number of children: 2    Years of education: None    Highest education level: None   Occupational History    Occupation: Retired   Tobacco Use    Smoking status: Former Smoker     Types: Cigarettes     Quit date: 2013     Years since quittin 3    Smokeless tobacco: Former User   Vaping Use    Vaping Use: Never used   Substance and Sexual Activity    Alcohol use: Yes     Comment: social    Drug use: No    Sexual activity: Yes   Other Topics Concern    None   Social History Narrative    None     Social Determinants of Health     Financial Resource Strain: Not on file   Food Insecurity: Not on file   Transportation Needs: Not on file   Physical Activity: Not on file   Stress: Not on file   Social Connections: Not on file   Intimate Partner Violence: Not on file   Housing Stability: Not on file      Medications and Allergies:     Current Outpatient Medications   Medication Sig Dispense Refill    dexlansoprazole (DEXILANT) 60 MG capsule Take 1 capsule (60 mg total) by mouth daily 90 capsule 1    famotidine (PEPCID) 40 MG tablet Take 1 tablet (40 mg total) by mouth daily 90 tablet 3    fexofenadine (ALLEGRA) 180 MG tablet Take by mouth      levothyroxine 50 mcg tablet Take 1 tablet (50 mcg total) by mouth daily 90 tablet 3    losartan (COZAAR) 100 MG tablet Take 1 tablet (100 mg total) by mouth daily 90 tablet 3     No current facility-administered medications for this visit       Allergies   Allergen Reactions    Cat Hair Extract Hives    Pollen Extract Sneezing    Dog Epithelium Allergy Skin Test Cough      Immunizations:     Immunization History   Administered Date(s) Administered    COVID-19 PFIZER VACCINE 0 3 ML IM 03/09/2021, 03/31/2021    INFLUENZA 10/18/2014, 11/04/2018, 10/10/2019    Influenza Injectable, MDCK, Preservative Free, Quadrivalent, 0 5 mL 11/10/2019    Influenza Quadrivalent Preservative Free 3 years and older IM 10/13/2015, 10/31/2017    Influenza, recombinant, quadrivalent,injectable, preservative free 09/04/2020    Influenza, seasonal, injectable 1955    Pneumococcal Polysaccharide PPV23 10/13/2015    Tdap 09/04/2020    Zoster 03/07/2016      Health Maintenance:         Topic Date Due    Breast Cancer Screening: Mammogram  10/10/2017    Cervical Cancer Screening  11/14/2022    Colorectal Cancer Screening  03/15/2026    Hepatitis C Screening  Completed         Topic Date Due    Pneumococcal Vaccine: 65+ Years (1 of 1 - PPSV23) 11/24/2020    COVID-19 Vaccine (3 - Booster for Cerrato Peter series) 08/31/2021      Medicare Health Risk Assessment:     /86 (BP Location: Right arm, Patient Position: Sitting, Cuff Size: Standard)   Pulse 72   Temp 97 6 °F (36 4 °C) (Oral)   Ht 5' 1" (1 549 m)   Wt 63 8 kg (140 lb 9 6 oz)   SpO2 98%   BMI 26 57 kg/m²      Sarah Li is here for her Welcome to Medicare visit  Health Risk Assessment:   Patient rates overall health as very good  Patient feels that their physical health rating is same  Patient is very satisfied with their life  Eyesight was rated as much worse  Hearing was rated as same  Patient feels that their emotional and mental health rating is much better  Patients states they are never, rarely angry  Patient states they are always unusually tired/fatigued  Pain experienced in the last 7 days has been none  Patient states that she has experienced no weight loss or gain in last 6 months  Depression Screening:   PHQ-2 Score: 0      Fall Risk Screening: In the past year, patient has experienced: no history of falling in past year      Urinary Incontinence Screening:   Patient has leaked urine accidently in the last six months  Home Safety:  Patient does not have trouble with stairs inside or outside of their home  Patient has working smoke alarms and has working carbon monoxide detector  Home safety hazards include: none  Nutrition:   Current diet is Regular  Medications:   Patient is not currently taking any over-the-counter supplements  Patient is able to manage medications  Activities of Daily Living (ADLs)/Instrumental Activities of Daily Living (IADLs):   Walk and transfer into and out of bed and chair?: Yes  Dress and groom yourself?: Yes    Bathe or shower yourself?: Yes    Feed yourself?  Yes  Do your laundry/housekeeping?: Yes  Manage your money, pay your bills and track your expenses?: Yes  Make your own meals?: Yes    Do your own shopping?: Yes    Previous Hospitalizations:   Any hospitalizations or ED visits within the last 12 months?: No      Advance Care Planning:   Living will: No    Durable POA for healthcare: No    Advanced directive: No    Five wishes given: Yes    Provider agrees with end of life decisions: Yes      Cognitive Screening:   Provider or family/friend/caregiver concerned regarding cognition?: No    PREVENTIVE SCREENINGS      Cardiovascular Screening:    General: Screening Not Indicated and History Lipid Disorder      Colorectal Cancer Screening:     General: Screening Current      Breast Cancer Screening:     General: Risks and Benefits Discussed      Cervical Cancer Screening:    General: Screening Not Indicated      Osteoporosis Screening:    General: Risks and Benefits Discussed      Lung Cancer Screening:     General: Screening Not Indicated      Hepatitis C Screening:    General: Screening Current    Screening, Brief Intervention, and Referral to Treatment (SBIRT)    Screening      AUDIT-C Screenin) How often did you have a drink containing alcohol in the past year? 4 or more times a week  2) How many drinks did you have on a typical day when you were drinking in the past year? 1 to 2  3) How often did you have 6 or more drinks on one occasion in the past year? never    AUDIT-C Score: 4  Interpretation: Score 3-12 (female): POSITIVE screen for alcohol misuse    AUDIT Screenin) How often during the last year have you found that you were not able to stop drinking once you had started? 0 - never  5) How often during the last year have you failed to do what was normally expected from you because of drinking? 0 - never  6) How often during the last year have you needed a first drink in the morning to get yourself going after a heavy drinking session?  0 - never  7) How often during the last year have you had a feeling of guilt or remorse after drinking? 0 - never  8) How often during the last year have you been unable to remember what happened the night before because you had been drinking? 0 - never  9) Have you or someone else been injured as a result of your drinking? 0 - no  10) Has a relative or friend or a doctor or another health worker been concerned about your drinking or suggested you cut down? 0 - no    AUDIT Score: 4  Interpretation: Low risk alcohol consumption      Amado Herrera MD

## 2022-02-23 NOTE — PATIENT INSTRUCTIONS
Medicare Preventive Visit Patient Instructions  Thank you for completing your Welcome to Medicare Visit or Medicare Annual Wellness Visit today  Your next wellness visit will be due in one year (2/24/2023)  The screening/preventive services that you may require over the next 5-10 years are detailed below  Some tests may not apply to you based off risk factors and/or age  Screening tests ordered at today's visit but not completed yet may show as past due  Also, please note that scanned in results may not display below  Preventive Screenings:  Service Recommendations Previous Testing/Comments   Colorectal Cancer Screening  * Colonoscopy    * Fecal Occult Blood Test (FOBT)/Fecal Immunochemical Test (FIT)  * Fecal DNA/Cologuard Test  * Flexible Sigmoidoscopy Age: 54-65 years old   Colonoscopy: every 10 years (may be performed more frequently if at higher risk)  OR  FOBT/FIT: every 1 year  OR  Cologuard: every 3 years  OR  Sigmoidoscopy: every 5 years  Screening may be recommended earlier than age 48 if at higher risk for colorectal cancer  Also, an individualized decision between you and your healthcare provider will decide whether screening between the ages of 74-80 would be appropriate  Colonoscopy: 03/15/2019  FOBT/FIT: Not on file  Cologuard: Not on file  Sigmoidoscopy: Not on file    Screening Current     Breast Cancer Screening Age: 36 years old  Frequency: every 1-2 years  Not required if history of left and right mastectomy Mammogram: 10/10/2016        Cervical Cancer Screening Between the ages of 21-29, pap smear recommended once every 3 years  Between the ages of 33-67, can perform pap smear with HPV co-testing every 5 years     Recommendations may differ for women with a history of total hysterectomy, cervical cancer, or abnormal pap smears in past  Pap Smear: 11/14/2017    Screening Not Indicated   Hepatitis C Screening Once for adults born between 1945 and 1965  More frequently in patients at high risk for Hepatitis C Hep C Antibody: 12/17/2019    Screening Current   Diabetes Screening 1-2 times per year if you're at risk for diabetes or have pre-diabetes Fasting glucose: 108 mg/dL   A1C: 5 7 %        Cholesterol Screening Once every 5 years if you don't have a lipid disorder  May order more often based on risk factors  Lipid panel: 12/17/2019    Screening Not Indicated  History Lipid Disorder     Other Preventive Screenings Covered by Medicare:  1  Abdominal Aortic Aneurysm (AAA) Screening: covered once if your at risk  You're considered to be at risk if you have a family history of AAA  2  Lung Cancer Screening: covers low dose CT scan once per year if you meet all of the following conditions: (1) Age 50-69; (2) No signs or symptoms of lung cancer; (3) Current smoker or have quit smoking within the last 15 years; (4) You have a tobacco smoking history of at least 30 pack years (packs per day multiplied by number of years you smoked); (5) You get a written order from a healthcare provider  3  Glaucoma Screening: covered annually if you're considered high risk: (1) You have diabetes OR (2) Family history of glaucoma OR (3)  aged 48 and older OR (3)  American aged 72 and older  3  Osteoporosis Screening: covered every 2 years if you meet one of the following conditions: (1) You're estrogen deficient and at risk for osteoporosis based off medical history and other findings; (2) Have a vertebral abnormality; (3) On glucocorticoid therapy for more than 3 months; (4) Have primary hyperparathyroidism; (5) On osteoporosis medications and need to assess response to drug therapy  · Last bone density test (DXA Scan): Not on file  5  HIV Screening: covered annually if you're between the age of 12-76  Also covered annually if you are younger than 13 and older than 72 with risk factors for HIV infection   For pregnant patients, it is covered up to 3 times per pregnancy  Immunizations:  Immunization Recommendations   Influenza Vaccine Annual influenza vaccination during flu season is recommended for all persons aged >= 6 months who do not have contraindications   Pneumococcal Vaccine (Prevnar and Pneumovax)  * Prevnar = PCV13  * Pneumovax = PPSV23   Adults 25-60 years old: 1-3 doses may be recommended based on certain risk factors  Adults 72 years old: Prevnar (PCV13) vaccine recommended followed by Pneumovax (PPSV23) vaccine  If already received PPSV23 since turning 65, then PCV13 recommended at least one year after PPSV23 dose  Hepatitis B Vaccine 3 dose series if at intermediate or high risk (ex: diabetes, end stage renal disease, liver disease)   Tetanus (Td) Vaccine - COST NOT COVERED BY MEDICARE PART B Following completion of primary series, a booster dose should be given every 10 years to maintain immunity against tetanus  Td may also be given as tetanus wound prophylaxis  Tdap Vaccine - COST NOT COVERED BY MEDICARE PART B Recommended at least once for all adults  For pregnant patients, recommended with each pregnancy  Shingles Vaccine (Shingrix) - COST NOT COVERED BY MEDICARE PART B  2 shot series recommended in those aged 48 and above     Health Maintenance Due:      Topic Date Due    Breast Cancer Screening: Mammogram  10/10/2017    Cervical Cancer Screening  11/14/2022    Colorectal Cancer Screening  03/15/2026    Hepatitis C Screening  Completed     Immunizations Due:      Topic Date Due    Pneumococcal Vaccine: 65+ Years (1 of 1 - PPSV23) 11/24/2020    COVID-19 Vaccine (3 - Booster for [place] series) 08/31/2021     Advance Directives   What are advance directives? Advance directives are legal documents that state your wishes and plans for medical care  These plans are made ahead of time in case you lose your ability to make decisions for yourself   Advance directives can apply to any medical decision, such as the treatments you want, and if you want to donate organs  What are the types of advance directives? There are many types of advance directives, and each state has rules about how to use them  You may choose a combination of any of the following:  · Living will: This is a written record of the treatment you want  You can also choose which treatments you do not want, which to limit, and which to stop at a certain time  This includes surgery, medicine, IV fluid, and tube feedings  · Durable power of  for healthcare [facility]): This is a written record that states who you want to make healthcare choices for you when you are unable to make them for yourself  This person, called a proxy, is usually a family member or a friend  You may choose more than 1 proxy  · Do not resuscitate (DNR) order:  A DNR order is used in case your heart stops beating or you stop breathing  It is a request not to have certain forms of treatment, such as CPR  A DNR order may be included in other types of advance directives  · Medical directive: This covers the care that you want if you are in a coma, near death, or unable to make decisions for yourself  You can list the treatments you want for each condition  Treatment may include pain medicine, surgery, blood transfusions, dialysis, IV or tube feedings, and a ventilator (breathing machine)  · Values history: This document has questions about your views, beliefs, and how you feel and think about life  This information can help others choose the care that you would choose  Why are advance directives important? An advance directive helps you control your care  Although spoken wishes may be used, it is better to have your wishes written down  Spoken wishes can be misunderstood, or not followed  Treatments may be given even if you do not want them  An advance directive may make it easier for your family to make difficult choices about your care     Urinary Incontinence   Urinary incontinence (UI)  is when you lose control of your bladder  UI develops because your bladder cannot store or empty urine properly  The 3 most common types of UI are stress incontinence, urge incontinence, or both  Medicines:   · May be given to help strengthen your bladder control  Report any side effects of medication to your healthcare provider  Do pelvic muscle exercises often:  Your pelvic muscles help you stop urinating  Squeeze these muscles tight for 5 seconds, then relax for 5 seconds  Gradually work up to squeezing for 10 seconds  Do 3 sets of 15 repetitions a day, or as directed  This will help strengthen your pelvic muscles and improve bladder control  Train your bladder:  Go to the bathroom at set times, such as every 2 hours, even if you do not feel the urge to go  You can also try to hold your urine when you feel the urge to go  For example, hold your urine for 5 minutes when you feel the urge to go  As that becomes easier, hold your urine for 10 minutes  Self-care:   · Keep a UI record  Write down how often you leak urine and how much you leak  Make a note of what you were doing when you leaked urine  · Drink liquids as directed  You may need to limit the amount of liquid you drink to help control your urine leakage  Do not drink any liquid right before you go to bed  Limit or do not have drinks that contain caffeine or alcohol  · Prevent constipation  Eat a variety of high-fiber foods  Good examples are high-fiber cereals, beans, vegetables, and whole-grain breads  Walking is the best way to trigger your intestines to have a bowel movement  · Exercise regularly and maintain a healthy weight  Weight loss and exercise will decrease pressure on your bladder and help you control your leakage  · Use a catheter as directed  to help empty your bladder  A catheter is a tiny, plastic tube that is put into your bladder to drain your urine  · Go to behavior therapy as directed    Behavior therapy may be used to help you learn to control your urge to urinate  Weight Management   Why it is important to manage your weight:  Being overweight increases your risk of health conditions such as heart disease, high blood pressure, type 2 diabetes, and certain types of cancer  It can also increase your risk for osteoarthritis, sleep apnea, and other respiratory problems  Aim for a slow, steady weight loss  Even a small amount of weight loss can lower your risk of health problems  How to lose weight safely:  A safe and healthy way to lose weight is to eat fewer calories and get regular exercise  You can lose up about 1 pound a week by decreasing the number of calories you eat by 500 calories each day  Healthy meal plan for weight management:  A healthy meal plan includes a variety of foods, contains fewer calories, and helps you stay healthy  A healthy meal plan includes the following:  · Eat whole-grain foods more often  A healthy meal plan should contain fiber  Fiber is the part of grains, fruits, and vegetables that is not broken down by your body  Whole-grain foods are healthy and provide extra fiber in your diet  Some examples of whole-grain foods are whole-wheat breads and pastas, oatmeal, brown rice, and bulgur  · Eat a variety of vegetables every day  Include dark, leafy greens such as spinach, kale, vicki greens, and mustard greens  Eat yellow and orange vegetables such as carrots, sweet potatoes, and winter squash  · Eat a variety of fruits every day  Choose fresh or canned fruit (canned in its own juice or light syrup) instead of juice  Fruit juice has very little or no fiber  · Eat low-fat dairy foods  Drink fat-free (skim) milk or 1% milk  Eat fat-free yogurt and low-fat cottage cheese  Try low-fat cheeses such as mozzarella and other reduced-fat cheeses  · Choose meat and other protein foods that are low in fat  Choose beans or other legumes such as split peas or lentils   Choose fish, skinless poultry (chicken or turkey), or lean cuts of red meat (beef or pork)  Before you cook meat or poultry, cut off any visible fat  · Use less fat and oil  Try baking foods instead of frying them  Add less fat, such as margarine, sour cream, regular salad dressing and mayonnaise to foods  Eat fewer high-fat foods  Some examples of high-fat foods include french fries, doughnuts, ice cream, and cakes  · Eat fewer sweets  Limit foods and drinks that are high in sugar  This includes candy, cookies, regular soda, and sweetened drinks  Exercise:  Exercise at least 30 minutes per day on most days of the week  Some examples of exercise include walking, biking, dancing, and swimming  You can also fit in more physical activity by taking the stairs instead of the elevator or parking farther away from stores  Ask your healthcare provider about the best exercise plan for you  Alcohol Use and Your Health    Drinking too much can harm your health  Excessive alcohol use leads to about 88,000 death in the [place] each year, and shortens the life of those who diet by almost 30 years  Further, excessive drinking cost the economy $249 billion in 2010  Most excessive drinkers are not alcohol dependent  Excessive alcohol use has immediate effects that increase the risk of many harmful health conditions  These are most often the result of binge drinking  Over time, excessive alcohol use can lead to the development of chronic diseases and other series health problems  What is considered a "drink"? Excessive alcohol use includes:  · Binge Drinking: For women, 4 or more drinks consumed on one occasion  For men, 5 or more drinks consumed on one occasion  · Heavy Drinking: For women, 8 or more drinks per week   For men, 15 or more drinks per week  · Any alcohol used by pregnant women  · Any alcohol used by those under the age of 21 years    If you choose to drink, do so in moderation:  · Do not drink at all if you are under the age of 21, or if you are or may be pregnant, or have health problems that could be made worse by drinking  · For women, up to 1 drink per day  · For men, up to 2 drinks a day    No one should begin drinking or drink more frequently based on potential health benefits    Short-Term Health Risks:  · Injuries: motor vehicle crashes, falls, drownings, burns  · Violence: homicide, suicide, sexual assault, intimate partner violence  · Alcohol poisoning  · Reproductive health: risky sexual behaviors, unintended prengnacy, sexually transmitted diseases, miscarriage, stillbirth, fetal alcohol syndrome    Long-Term Health Risks:  · Chronic diseases: high blood pressure, heart disease, stroke, liver disease, digestive problems  · Cancers: breast, mouth and throat, liver, colon  · Learning and memory problems: dementia, poor school performance  · Mental health: depression, anxiety, insomnia  · Social problems: lost productivity, family problems, unemployment  · Alcohol dependence    For support and more information:  · Substance Abuse and [facility] , [address] [place]  Web Address: https://[employer]/    · Alcoholics Anonymous        Web Address: http://www cloud info/    https://www cdc gov/alcohol/fact-sheets/alcohol-use htm     © Copyright [employer] 2018 Information is for End User's use only and may not be sold, redistributed or otherwise used for commercial purposes   All illustrations and images included in CareNotes® are the copyrighted property of A D A M , Inc  or [address] [place], [place] [address]

## 2022-02-23 NOTE — PROGRESS NOTES
Assessment/Plan:    No problem-specific Assessment & Plan notes found for this encounter  Diagnoses and all orders for this visit:    Welcome to Medicare preventive visit    Mixed hyperlipidemia  -     Comprehensive metabolic panel; Future  -     Lipid Panel with Direct LDL reflex; Future  -     T4, free; Future  -     TSH, 3rd generation with Free T4 reflex; Future  -     Comprehensive metabolic panel  -     Lipid Panel with Direct LDL reflex  -     T4, free  -     TSH, 3rd generation with Free T4 reflex    Benign essential hypertension    Hypothyroidism due to Hashimoto's thyroiditis  -     Comprehensive metabolic panel; Future  -     Lipid Panel with Direct LDL reflex; Future  -     T4, free; Future  -     TSH, 3rd generation with Free T4 reflex; Future  -     Comprehensive metabolic panel  -     Lipid Panel with Direct LDL reflex  -     T4, free  -     TSH, 3rd generation with Free T4 reflex    Hypothyroidism, unspecified type  -     levothyroxine 50 mcg tablet; Take 1 tablet (50 mcg total) by mouth daily    Gastroesophageal reflux disease with esophagitis  -     famotidine (PEPCID) 40 MG tablet; Take 1 tablet (40 mg total) by mouth daily    Essential hypertension  -     losartan (COZAAR) 100 MG tablet; Take 1 tablet (100 mg total) by mouth daily          Subjective:   Chief Complaint   Patient presents with    Medicare Wellness Visit        Patient ID: Radha Tapia is a 77 y o  female  annual      The following portions of the patient's history were reviewed and updated as appropriate: allergies, current medications, past family history, past medical history, past social history, past surgical history and problem list     Review of Systems   Constitutional: Negative for fatigue, fever and unexpected weight change  HENT: Negative for congestion, sinus pain and sore throat  Eyes: Negative for visual disturbance  Respiratory: Negative for shortness of breath and wheezing      Cardiovascular: Negative for chest pain and palpitations  Gastrointestinal: Negative for abdominal pain, nausea and vomiting  Musculoskeletal: Negative  Negative for arthralgias and myalgias  Neurological: Negative for syncope, weakness and numbness  Psychiatric/Behavioral: Negative  Negative for confusion, dysphoric mood and suicidal ideas  Objective:  Vitals:    02/23/22 1104   BP: 132/86   BP Location: Right arm   Patient Position: Sitting   Cuff Size: Standard   Pulse: 72   Temp: 97 6 °F (36 4 °C)   TempSrc: Oral   SpO2: 98%   Weight: 63 8 kg (140 lb 9 6 oz)   Height: 5' 1" (1 549 m)      Physical Exam  Constitutional:       Appearance: She is well-developed  HENT:      Right Ear: Ear canal normal  Tympanic membrane is not injected  Left Ear: Ear canal normal  Tympanic membrane is not injected  Nose: Nose normal    Eyes:      General:         Right eye: No discharge  Left eye: No discharge  Conjunctiva/sclera: Conjunctivae normal       Pupils: Pupils are equal, round, and reactive to light  Neck:      Thyroid: No thyromegaly  Cardiovascular:      Rate and Rhythm: Normal rate and regular rhythm  Heart sounds: Normal heart sounds  No murmur heard  Pulmonary:      Effort: Pulmonary effort is normal  No respiratory distress  Breath sounds: Normal breath sounds  No wheezing  Abdominal:      General: Bowel sounds are normal  There is no distension  Palpations: Abdomen is soft  Tenderness: There is no abdominal tenderness  Musculoskeletal:         General: Normal range of motion  Cervical back: Normal range of motion and neck supple  Lymphadenopathy:      Cervical: No cervical adenopathy  Skin:     General: Skin is warm and dry  Neurological:      Mental Status: She is alert and oriented to person, place, and time  She is not disoriented  Sensory: No sensory deficit        Gait: Gait normal       Deep Tendon Reflexes: Reflexes are normal and symmetric  Psychiatric:         Speech: Speech normal          Behavior: Behavior normal          Thought Content:  Thought content normal          Judgment: Judgment normal

## 2022-03-25 ENCOUNTER — TELEPHONE (OUTPATIENT)
Dept: FAMILY MEDICINE CLINIC | Facility: CLINIC | Age: 67
End: 2022-03-25

## 2022-03-25 LAB
ALBUMIN SERPL-MCNC: 4.3 G/DL (ref 3.8–4.8)
ALBUMIN/GLOB SERPL: 1.3 {RATIO} (ref 1.2–2.2)
ALP SERPL-CCNC: 89 IU/L (ref 44–121)
ALT SERPL-CCNC: 13 IU/L (ref 0–32)
AST SERPL-CCNC: 18 IU/L (ref 0–40)
BILIRUB SERPL-MCNC: <0.2 MG/DL (ref 0–1.2)
BUN SERPL-MCNC: 16 MG/DL (ref 8–27)
BUN/CREAT SERPL: 14 (ref 12–28)
CALCIUM SERPL-MCNC: 10.2 MG/DL (ref 8.7–10.3)
CHLORIDE SERPL-SCNC: 105 MMOL/L (ref 96–106)
CHOLEST SERPL-MCNC: 195 MG/DL (ref 100–199)
CO2 SERPL-SCNC: 20 MMOL/L (ref 20–29)
CREAT SERPL-MCNC: 1.15 MG/DL (ref 0.57–1)
EGFR: 53 ML/MIN/1.73
GLOBULIN SER-MCNC: 3.3 G/DL (ref 1.5–4.5)
GLUCOSE SERPL-MCNC: 99 MG/DL (ref 65–99)
HDLC SERPL-MCNC: 58 MG/DL
LDLC SERPL CALC-MCNC: 121 MG/DL (ref 0–99)
LDLC/HDLC SERPL: 2.1 RATIO (ref 0–3.2)
POTASSIUM SERPL-SCNC: 4.3 MMOL/L (ref 3.5–5.2)
PROT SERPL-MCNC: 7.6 G/DL (ref 6–8.5)
SL AMB VLDL CHOLESTEROL CALC: 16 MG/DL (ref 5–40)
SODIUM SERPL-SCNC: 142 MMOL/L (ref 134–144)
T4 FREE SERPL-MCNC: 1.42 NG/DL (ref 0.82–1.77)
TRIGL SERPL-MCNC: 90 MG/DL (ref 0–149)
TSH SERPL DL<=0.005 MIU/L-ACNC: 3.18 UIU/ML (ref 0.45–4.5)

## 2022-04-01 ENCOUNTER — TELEPHONE (OUTPATIENT)
Dept: FAMILY MEDICINE CLINIC | Facility: CLINIC | Age: 67
End: 2022-04-01

## 2022-04-01 DIAGNOSIS — N30.00 ACUTE CYSTITIS WITHOUT HEMATURIA: Primary | ICD-10-CM

## 2022-04-01 RX ORDER — NITROFURANTOIN 25; 75 MG/1; MG/1
100 CAPSULE ORAL 2 TIMES DAILY
Qty: 10 CAPSULE | Refills: 0 | Status: SHIPPED | OUTPATIENT
Start: 2022-04-01 | End: 2022-04-01 | Stop reason: SDUPTHER

## 2022-04-01 NOTE — TELEPHONE ENCOUNTER
Pt c/o of a UTI, states she is very frequent urinating, yes pain, constant pressure  Declined ov as she is leaving to [facility] on Monday, pt requests antibiotic       [facility] [phone]

## 2022-04-02 ENCOUNTER — APPOINTMENT (EMERGENCY)
Dept: CT IMAGING | Facility: HOSPITAL | Age: 67
End: 2022-04-02
Payer: COMMERCIAL

## 2022-04-02 ENCOUNTER — HOSPITAL ENCOUNTER (EMERGENCY)
Facility: HOSPITAL | Age: 67
Discharge: HOME | End: 2022-04-02
Attending: EMERGENCY MEDICINE | Admitting: EMERGENCY MEDICINE
Payer: COMMERCIAL

## 2022-04-02 VITALS
DIASTOLIC BLOOD PRESSURE: 80 MMHG | TEMPERATURE: 99.8 F | SYSTOLIC BLOOD PRESSURE: 138 MMHG | OXYGEN SATURATION: 96 % | HEART RATE: 82 BPM | RESPIRATION RATE: 17 BRPM

## 2022-04-02 DIAGNOSIS — K57.92 ACUTE DIVERTICULITIS: Primary | ICD-10-CM

## 2022-04-02 LAB
ALBUMIN SERPL BCP-MCNC: 3.3 G/DL (ref 3.5–5)
ALP SERPL-CCNC: 85 U/L (ref 46–116)
ALT SERPL W P-5'-P-CCNC: 19 U/L (ref 12–78)
ANION GAP SERPL CALCULATED.3IONS-SCNC: 9 MMOL/L (ref 4–13)
AST SERPL W P-5'-P-CCNC: 15 U/L (ref 5–45)
BASOPHILS # BLD AUTO: 0.04 THOUSANDS/ΜL (ref 0–0.1)
BASOPHILS NFR BLD AUTO: 0 % (ref 0–1)
BILIRUB SERPL-MCNC: 0.5 MG/DL (ref 0.2–1)
BILIRUB UR QL STRIP: NEGATIVE
BUN SERPL-MCNC: 13 MG/DL (ref 5–25)
CALCIUM ALBUM COR SERPL-MCNC: 9 MG/DL (ref 8.3–10.1)
CALCIUM SERPL-MCNC: 8.4 MG/DL (ref 8.3–10.1)
CHLORIDE SERPL-SCNC: 103 MMOL/L (ref 100–108)
CLARITY UR: CLEAR
CO2 SERPL-SCNC: 25 MMOL/L (ref 21–32)
COLOR UR: YELLOW
CREAT SERPL-MCNC: 1.15 MG/DL (ref 0.6–1.3)
EOSINOPHIL # BLD AUTO: 0.01 THOUSAND/ΜL (ref 0–0.61)
EOSINOPHIL NFR BLD AUTO: 0 % (ref 0–6)
ERYTHROCYTE [DISTWIDTH] IN BLOOD BY AUTOMATED COUNT: 13.3 % (ref 11.6–15.1)
GFR SERPL CREATININE-BSD FRML MDRD: 49 ML/MIN/1.73SQ M
GLUCOSE SERPL-MCNC: 111 MG/DL (ref 65–140)
GLUCOSE UR STRIP-MCNC: NEGATIVE MG/DL
HCT VFR BLD AUTO: 34.4 % (ref 34.8–46.1)
HGB BLD-MCNC: 11.4 G/DL (ref 11.5–15.4)
HGB UR QL STRIP.AUTO: NEGATIVE
IMM GRANULOCYTES # BLD AUTO: 0.07 THOUSAND/UL (ref 0–0.2)
IMM GRANULOCYTES NFR BLD AUTO: 1 % (ref 0–2)
KETONES UR STRIP-MCNC: NEGATIVE MG/DL
LEUKOCYTE ESTERASE UR QL STRIP: NEGATIVE
LIPASE SERPL-CCNC: 105 U/L (ref 73–393)
LYMPHOCYTES # BLD AUTO: 1.01 THOUSANDS/ΜL (ref 0.6–4.47)
LYMPHOCYTES NFR BLD AUTO: 8 % (ref 14–44)
MCH RBC QN AUTO: 30.3 PG (ref 26.8–34.3)
MCHC RBC AUTO-ENTMCNC: 33.1 G/DL (ref 31.4–37.4)
MCV RBC AUTO: 92 FL (ref 82–98)
MONOCYTES # BLD AUTO: 0.78 THOUSAND/ΜL (ref 0.17–1.22)
MONOCYTES NFR BLD AUTO: 6 % (ref 4–12)
NEUTROPHILS # BLD AUTO: 11.1 THOUSANDS/ΜL (ref 1.85–7.62)
NEUTS SEG NFR BLD AUTO: 85 % (ref 43–75)
NITRITE UR QL STRIP: NEGATIVE
NRBC BLD AUTO-RTO: 0 /100 WBCS
PH UR STRIP.AUTO: 7 [PH]
PLATELET # BLD AUTO: 308 THOUSANDS/UL (ref 149–390)
PMV BLD AUTO: 9.3 FL (ref 8.9–12.7)
POTASSIUM SERPL-SCNC: 3.7 MMOL/L (ref 3.5–5.3)
PROT SERPL-MCNC: 7.6 G/DL (ref 6.4–8.2)
PROT UR STRIP-MCNC: NEGATIVE MG/DL
RBC # BLD AUTO: 3.76 MILLION/UL (ref 3.81–5.12)
SODIUM SERPL-SCNC: 137 MMOL/L (ref 136–145)
SP GR UR STRIP.AUTO: 1.01 (ref 1–1.03)
UROBILINOGEN UR QL STRIP.AUTO: 0.2 E.U./DL
WBC # BLD AUTO: 13.01 THOUSAND/UL (ref 4.31–10.16)

## 2022-04-02 PROCEDURE — 36415 COLL VENOUS BLD VENIPUNCTURE: CPT

## 2022-04-02 PROCEDURE — 80053 COMPREHEN METABOLIC PANEL: CPT

## 2022-04-02 PROCEDURE — 74177 CT ABD & PELVIS W/CONTRAST: CPT

## 2022-04-02 PROCEDURE — 81003 URINALYSIS AUTO W/O SCOPE: CPT

## 2022-04-02 PROCEDURE — G1004 CDSM NDSC: HCPCS

## 2022-04-02 PROCEDURE — 85025 COMPLETE CBC W/AUTO DIFF WBC: CPT

## 2022-04-02 PROCEDURE — 83690 ASSAY OF LIPASE: CPT

## 2022-04-02 PROCEDURE — 96374 THER/PROPH/DIAG INJ IV PUSH: CPT

## 2022-04-02 PROCEDURE — 99284 EMERGENCY DEPT VISIT MOD MDM: CPT

## 2022-04-02 PROCEDURE — 99285 EMERGENCY DEPT VISIT HI MDM: CPT

## 2022-04-02 RX ORDER — CIPROFLOXACIN 500 MG/1
500 TABLET, FILM COATED ORAL 2 TIMES DAILY
Qty: 20 TABLET | Refills: 0 | Status: SHIPPED | OUTPATIENT
Start: 2022-04-02 | End: 2022-04-12

## 2022-04-02 RX ORDER — MORPHINE SULFATE 4 MG/ML
4 INJECTION, SOLUTION INTRAMUSCULAR; INTRAVENOUS ONCE
Status: COMPLETED | OUTPATIENT
Start: 2022-04-02 | End: 2022-04-02

## 2022-04-02 RX ORDER — METRONIDAZOLE 500 MG/1
500 TABLET ORAL EVERY 8 HOURS SCHEDULED
Qty: 30 TABLET | Refills: 0 | Status: SHIPPED | OUTPATIENT
Start: 2022-04-02 | End: 2022-04-12

## 2022-04-02 RX ADMIN — MORPHINE SULFATE 4 MG: 4 INJECTION INTRAVENOUS at 11:05

## 2022-04-02 RX ADMIN — IOHEXOL 100 ML: 350 INJECTION, SOLUTION INTRAVENOUS at 11:52

## 2022-04-02 NOTE — DISCHARGE INSTRUCTIONS
Follow up with PCP doctor for new diagnosis of acute diverticulitis  Take medications (metronidazole + ciprofloxacin) as prescribed and stop current antibiotic (Macrobid)  Take Tylenol for pain  Return to ED if symptoms worsen as listed in the aftercare instructions  Also follow up with nephrologist for chronic diagnosis of right hydroutereronephrosis to be monitored

## 2022-04-02 NOTE — ED PROVIDER NOTES
History  Chief Complaint   Patient presents with    Abdominal Pain     lower abdominal pain for a week  Painful urination  77year old female presents to the ED with lower abdominal pain x 1 week  Pt states the pain is a 3-4 with episodes of sharp stabbing pain at a 10 when she urinates/defecates  Pt called PCP who prescribed Macrobid x 2 days ago for a UTI, no urine was tested at that time  Denies sx relief with antibiotics, sx have since gotten worse  Pain is alleviated by Tylenol  Pt admits to fever/chills, nausea, constipation, burning with urination  Denies vomiting, blood in stool or urine, weakness  Medical Hx + for diverticulosis, gallstones, HTN, hyperlipidemia  Shx appendectomy and BL salpinoopherectomy  History provided by:  Patient   used: No    Abdominal Pain  Pain location:  LLQ and suprapubic  Pain quality: sharp and stabbing    Pain radiates to:  Does not radiate  Onset quality:  Gradual  Duration:  1 week  Timing:  Constant  Progression:  Worsening  Chronicity:  New  Relieved by:  Acetaminophen  Worsened by:  Urination and bowel movements  Associated symptoms: chills, constipation, dysuria, fatigue, fever and nausea    Associated symptoms: no chest pain, no diarrhea, no hematemesis, no hematochezia, no hematuria, no melena, no shortness of breath, no vaginal bleeding, no vaginal discharge and no vomiting        Prior to Admission Medications   Prescriptions Last Dose Informant Patient Reported?  Taking?   dexlansoprazole (DEXILANT) 60 MG capsule   No No   Sig: Take 1 capsule (60 mg total) by mouth daily   famotidine (PEPCID) 40 MG tablet   No No   Sig: Take 1 tablet (40 mg total) by mouth daily   fexofenadine (ALLEGRA) 180 MG tablet  Self Yes No   Sig: Take by mouth   levothyroxine 50 mcg tablet   No No   Sig: Take 1 tablet (50 mcg total) by mouth daily   losartan (COZAAR) 100 MG tablet   No No   Sig: Take 1 tablet (100 mg total) by mouth daily   nitrofurantoin (MACROBID) 100 mg capsule   No No   Sig: Take 1 capsule (100 mg total) by mouth 2 (two) times a day for 5 days      Facility-Administered Medications: None       Past Medical History:   Diagnosis Date    Colon polyp     Disease of thyroid gland     Gallstones     GI (gastrointestinal bleed)     History of bilateral oophorectomy     Hyperlipidemia     Hypertension     Iron (Fe) deficiency anemia     Osteopenia     Renal disorder        Past Surgical History:   Procedure Laterality Date    APPENDECTOMY      BILATERAL SALPINGOOPHORECTOMY      JOINT REPLACEMENT      KNEE SURGERY      NO PAST SURGERIES      REPLACEMENT TOTAL KNEE BILATERAL         Family History   Problem Relation Age of Onset    Liver cancer Mother     Hypertension Mother     Diabetes Mother     Liver disease Mother     Hypertension Father     Inflammatory bowel disease Father     Diabetes Paternal Grandfather     Substance Abuse Neg Hx     Mental illness Neg Hx     Colon polyps Neg Hx     Colon cancer Neg Hx      I have reviewed and agree with the history as documented  E-Cigarette/Vaping    E-Cigarette Use Never User      E-Cigarette/Vaping Substances    Nicotine No     THC No     CBD No     Flavoring No     Other No     Unknown No      Social History     Tobacco Use    Smoking status: Former Smoker     Types: Cigarettes     Quit date: 2013     Years since quittin 4    Smokeless tobacco: Former User   Vaping Use    Vaping Use: Never used   Substance Use Topics    Alcohol use: Yes     Comment: social    Drug use: No       Review of Systems   Constitutional: Positive for chills, fatigue and fever  Respiratory: Negative for shortness of breath  Cardiovascular: Negative for chest pain  Gastrointestinal: Positive for abdominal pain, constipation and nausea  Negative for blood in stool, diarrhea, hematemesis, hematochezia, melena and vomiting  Genitourinary: Positive for dysuria   Negative for decreased urine volume, hematuria, vaginal bleeding and vaginal discharge  Skin: Negative for color change  Neurological: Negative for light-headedness and headaches  All other systems reviewed and are negative  Physical Exam  Physical Exam  Vitals and nursing note reviewed  Constitutional:       General: She is awake  Appearance: Normal appearance  She is well-developed and normal weight  HENT:      Head: Normocephalic and atraumatic  Mouth/Throat:      Mouth: Mucous membranes are moist       Pharynx: Oropharynx is clear  Eyes:      Extraocular Movements: Extraocular movements intact  Pupils: Pupils are equal, round, and reactive to light  Cardiovascular:      Rate and Rhythm: Normal rate and regular rhythm  Heart sounds: Normal heart sounds, S1 normal and S2 normal  No murmur heard  No gallop  Pulmonary:      Effort: Pulmonary effort is normal  No respiratory distress  Breath sounds: Normal breath sounds and air entry  No stridor or decreased air movement  No wheezing, rhonchi or rales  Abdominal:      General: Abdomen is flat  Bowel sounds are normal  There is distension  There is no abdominal bruit  Palpations: Abdomen is soft  Tenderness: There is abdominal tenderness in the suprapubic area and left lower quadrant  There is guarding  There is no rebound  Hernia: No hernia is present  Skin:     General: Skin is warm and dry  Neurological:      General: No focal deficit present  Mental Status: She is alert  Psychiatric:         Behavior: Behavior is cooperative           Vital Signs  ED Triage Vitals [04/02/22 0957]   Temperature Pulse Respirations Blood Pressure SpO2   99 8 °F (37 7 °C) 98 19 148/85 98 %      Temp Source Heart Rate Source Patient Position - Orthostatic VS BP Location FiO2 (%)   Temporal Monitor Lying Right arm --      Pain Score       4           Vitals:    04/02/22 0957 04/02/22 1115 04/02/22 1215   BP: 148/85 138/85 138/80   Pulse: 98 80 82   Patient Position - Orthostatic VS: Lying Lying Lying         Visual Acuity      ED Medications  Medications   morphine (PF) 4 mg/mL injection 4 mg (4 mg Intravenous Given 4/2/22 1105)   iohexol (OMNIPAQUE) 350 MG/ML injection (SINGLE-DOSE) 100 mL (100 mL Intravenous Given 4/2/22 1152)       Diagnostic Studies  Results Reviewed     Procedure Component Value Units Date/Time    UA w Reflex to Microscopic w Reflex to Culture [[id number]] Collected: 04/02/22 1045    Lab Status: Final result Specimen: Urine, Clean Catch Updated: 04/02/22 1220     Color, UA Yellow     Clarity, UA Clear     Specific Gravity, UA 1 010     pH, UA 7 0     Leukocytes, UA Negative     Nitrite, UA Negative     Protein, UA Negative mg/dl      Glucose, UA Negative mg/dl      Ketones, UA Negative mg/dl      Urobilinogen, UA 0 2 E U /dl      Bilirubin, UA Negative     Blood, UA Negative    CMP [[id number]]  (Abnormal) Collected: 04/02/22 1104    Lab Status: Final result Specimen: Blood from Arm, Left Updated: 04/02/22 1138     Sodium 137 mmol/L      Potassium 3 7 mmol/L      Chloride 103 mmol/L      CO2 25 mmol/L      ANION GAP 9 mmol/L      BUN 13 mg/dL      Creatinine 1 15 mg/dL      Glucose 111 mg/dL      Calcium 8 4 mg/dL      Corrected Calcium 9 0 mg/dL      AST 15 U/L      ALT 19 U/L      Alkaline Phosphatase 85 U/L      Total Protein 7 6 g/dL      Albumin 3 3 g/dL      Total Bilirubin 0 50 mg/dL      eGFR 49 ml/min/1 73sq m     Narrative:      Jb guidelines for Chronic Kidney Disease (CKD):     Stage 1 with normal or high GFR (GFR > 90 mL/min/1 73 square meters)    Stage 2 Mild CKD (GFR = 60-89 mL/min/1 73 square meters)    Stage 3A Moderate CKD (GFR = 45-59 mL/min/1 73 square meters)    Stage 3B Moderate CKD (GFR = 30-44 mL/min/1 73 square meters)    Stage 4 Severe CKD (GFR = 15-29 mL/min/1 73 square meters)    Stage 5 End Stage CKD (GFR <15 mL/min/1 73 square meters)  Note: GFR calculation is accurate only with a steady state creatinine    Lipase [[id number]]  (Normal) Collected: 04/02/22 1104    Lab Status: Final result Specimen: Blood from Arm, Left Updated: 04/02/22 1138     Lipase 105 u/L     CBC and differential [[id number]]  (Abnormal) Collected: 04/02/22 1104    Lab Status: Final result Specimen: Blood from Arm, Left Updated: 04/02/22 1118     WBC 13 01 Thousand/uL      RBC 3 76 Million/uL      Hemoglobin 11 4 g/dL      Hematocrit 34 4 %      MCV 92 fL      MCH 30 3 pg      MCHC 33 1 g/dL      RDW 13 3 %      MPV 9 3 fL      Platelets 168 Thousands/uL      nRBC 0 /100 WBCs      Neutrophils Relative 85 %      Immat GRANS % 1 %      Lymphocytes Relative 8 %      Monocytes Relative 6 %      Eosinophils Relative 0 %      Basophils Relative 0 %      Neutrophils Absolute 11 10 Thousands/µL      Immature Grans Absolute 0 07 Thousand/uL      Lymphocytes Absolute 1 01 Thousands/µL      Monocytes Absolute 0 78 Thousand/µL      Eosinophils Absolute 0 01 Thousand/µL      Basophils Absolute 0 04 Thousands/µL                  CT Abdomen pelvis with contrast   Final Result by Emerita Clemons DO (04/02 1238)      1  Mild hazy perisigmoid fat stranding/edema suggesting subtle acute or subacute diverticulitis  No extraluminal air or abscess  2  Severe chronic right hydroureteronephrosis with suspected chronic distal ureteral obstruction in the pelvis  Additional incidental findings as above  Workstation performed: [id number]                      ED Course  ED Course as of 04/02/22 1334   Sat Apr 02, 2022   1120 WBC(!): 13 01             MDM  Number of Diagnoses or Management Options  Acute diverticulitis: new and requires workup  Diagnosis management comments: 78 y/o female pt came in for lower abdominal pain, chills and nausea x 1 week  After a thorough H&P, positive findings included slight fever, chills, nausea, constipation, dysuria and LLQ pain and guarding   Negative for blood in stool or urine, rebound, weakness, dehydration  Differential dx included UTI, bladder infection, diverticulitis  CT scan showed acute diverticulitis (no abscess, rupture or free air noted) and a chronic hydroutereteronephrosis already known to patient  Patient's condition was stable during visit and she was in no acute distress  Pain was managed by morphine in ED due to allergy to Toradol  Informed patient of diagnosis and concluded that since she was stable and does not have a significant medical history, she could be treated outpatient  Diagnosis of Acute Diverticulitis determined  To be managed by outpatient oral antibiotics and OTC tylenol for pain  Return instructions were given to patient for follow up with PCP, nephrology and ED  Amount and/or Complexity of Data Reviewed  Clinical lab tests: ordered and reviewed  Tests in the radiology section of CPT®: ordered and reviewed    Risk of Complications, Morbidity, and/or Mortality  Presenting problems: low  Diagnostic procedures: minimal  Management options: low    Patient Progress  Patient progress: stable      Disposition  Final diagnoses:   Acute diverticulitis     Time reflects when diagnosis was documented in both MDM as applicable and the Disposition within this note     Time User Action Codes Description Comment    4/2/2022 12:49 PM Johnnie Chavez Add [K57 [id number]] Acute diverticulitis       ED Disposition     ED Disposition Condition Date/Time Comment    Discharge Stable Sat Apr 2, 2022 12:48 PM Angle Enriquez discharge to home/self care              Follow-up Information     Follow up With Specialties Details Why Contact Info Additional Information    Vince Crooks MD Family Medicine Schedule an appointment as soon as possible for a visit in 3 days For follow up on new diagnosis of acute diverticulitis [id number] [facility] [address]  Emergency Department Emergency Medicine Go to As needed if symptoms worsen  Return to the ED If you develop a high fever, severe abdominal pain, blood in stool, worsening symptoms, chest pain, difficulty breathing  [address] [place],[address] [id number]  [address] [facility] Emergency Department, [address], [facility], [facility] [place] [address]          Discharge Medication List as of 4/2/2022  1:00 PM      START taking these medications    Details   ciprofloxacin (CIPRO) 500 mg tablet Take 1 tablet (500 mg total) by mouth 2 (two) times a day for 10 days, Starting Sat 4/2/2022, Until Tue 4/12/2022, Normal      metroNIDAZOLE (FLAGYL) 500 mg tablet Take 1 tablet (500 mg total) by mouth every 8 (eight) hours for 10 days, Starting Sat 4/2/2022, Until Tue 4/12/2022, Normal         CONTINUE these medications which have NOT CHANGED    Details   dexlansoprazole (DEXILANT) 60 MG capsule Take 1 capsule (60 mg total) by mouth daily, Starting Mon 8/9/2021, Until Sun 11/7/2021, Normal      famotidine (PEPCID) 40 MG tablet Take 1 tablet (40 mg total) by mouth daily, Starting Wed 2/23/2022, Normal      fexofenadine (ALLEGRA) 180 MG tablet Take by mouth, Historical Med      levothyroxine 50 mcg tablet Take 1 tablet (50 mcg total) by mouth daily, Starting Wed 2/23/2022, Normal      losartan (COZAAR) 100 MG tablet Take 1 tablet (100 mg total) by mouth daily, Starting Wed 2/23/2022, Normal      nitrofurantoin (MACROBID) 100 mg capsule Take 1 capsule (100 mg total) by mouth 2 (two) times a day for 5 days, Starting Fri 4/1/2022, Until Wed 4/6/2022, Normal             No discharge procedures on file      PDMP Review       Value Time User    PDMP Reviewed  Yes 2/19/2021 12:21 PM Gregor Govea DO          ED Provider  Electronically Signed by           Milan Rodríguez PA-C  04/02/22 1403

## 2022-08-01 ENCOUNTER — NURSE TRIAGE (OUTPATIENT)
Dept: OTHER | Facility: OTHER | Age: 67
End: 2022-08-01

## 2022-08-01 DIAGNOSIS — J01.00 ACUTE NON-RECURRENT MAXILLARY SINUSITIS: Primary | ICD-10-CM

## 2022-08-01 RX ORDER — AMOXICILLIN AND CLAVULANATE POTASSIUM 875; 125 MG/1; MG/1
1 TABLET, FILM COATED ORAL EVERY 12 HOURS SCHEDULED
Qty: 20 TABLET | Refills: 0 | Status: SHIPPED | OUTPATIENT
Start: 2022-08-01 | End: 2022-08-11

## 2022-08-01 NOTE — TELEPHONE ENCOUNTER
Regarding: sinus infection  ----- Message from Alphonso sent at 8/1/2022  8:10 AM EDT -----  "I need sinus infection medication"

## 2022-08-01 NOTE — TELEPHONE ENCOUNTER
Patient reports that since Saturday she has been experiencing congestion, sinus pressure, and fevers  She tested negative for COVID and believes it is a sinus infection  She would like to know if there is something she could have called in to her pharmacy [facility])  She would like a call back to advise  Reason for Disposition   Sinus congestion as part of a cold, present < 10 days    Answer Assessment - Initial Assessment Questions  1  LOCATION: "Where does it hurt?"       Head   2  ONSET: "When did the sinus pain start?"  (e g , hours, days)       7/30/33  3  SEVERITY: "How bad is the pain?"   (Scale 1-10; mild, moderate or severe)    - MILD (1-3): doesn't interfere with normal activities     - MODERATE (4-7): interferes with normal activities (e g , work or school) or awakens from sleep    - SEVERE (8-10): excruciating pain and patient unable to do any normal activities         Moderate  4  RECURRENT SYMPTOM: "Have you ever had sinus problems before?" If Yes, ask: "When was the last time?" and "What happened that time?"       Sinus pressure   5  NASAL CONGESTION: "Is the nose blocked?" If Yes, ask: "Can you open it or must you breathe through the mouth?"      Must breathe through mouth  6  NASAL DISCHARGE: "Do you have discharge from your nose?" If so ask, "What color?"      Denies  7  FEVER: "Do you have a fever?" If Yes, ask: "What is it, how was it measured, and when did it start?"       Yes, off and on,   8  OTHER SYMPTOMS: "Do you have any other symptoms?" (e g , sore throat, cough, earache, difficulty breathing)      Sinus pressure, congestion, headache, post nasal drip, nausea, fever  9   PREGNANCY: "Is there any chance you are pregnant?" "When was your last menstrual period?"      N/A    Protocols used: SINUS PAIN OR CONGESTION-ADULT-OH

## 2025-05-13 ENCOUNTER — OFFICE VISIT (OUTPATIENT)
Dept: URBAN - METROPOLITAN AREA CLINIC 107 | Facility: CLINIC | Age: 70
End: 2025-05-13
Payer: MEDICARE

## 2025-05-13 DIAGNOSIS — R79.89 ELEVATED LFTS: ICD-10-CM

## 2025-05-13 DIAGNOSIS — K59.04 CHRONIC IDIOPATHIC CONSTIPATION: ICD-10-CM

## 2025-05-13 DIAGNOSIS — K21.9 GERD WITHOUT ESOPHAGITIS: ICD-10-CM

## 2025-05-13 PROBLEM — 266435005: Status: ACTIVE | Noted: 2025-05-13

## 2025-05-13 PROBLEM — 82934008: Status: ACTIVE | Noted: 2025-05-13

## 2025-05-13 PROCEDURE — 99204 OFFICE O/P NEW MOD 45 MIN: CPT | Performed by: INTERNAL MEDICINE

## 2025-05-13 RX ORDER — FERROUS SULFATE 325(65) MG
1 TABLET TABLET ORAL
Status: ON HOLD | COMMUNITY

## 2025-05-13 RX ORDER — WHEAT DEXTRIN 3 G/3.5 G
1 TABLESPOON IN 8 OZS NONCARBONATED BEVERAGE POWDER (GRAM) ORAL TWICE A DAY
OUTPATIENT

## 2025-05-13 RX ORDER — LOSARTAN POTASSIUM AND HYDROCHLOROTHIAZIDE 100; 12.5 MG/1; MG/1
1 TABLET TABLET, FILM COATED ORAL ONCE A DAY
Status: ACTIVE | COMMUNITY

## 2025-05-13 RX ORDER — AMLODIPINE BESYLATE 5 MG/1
1 TABLET TABLET ORAL ONCE A DAY
Status: ACTIVE | COMMUNITY

## 2025-05-13 RX ORDER — PANTOPRAZOLE SODIUM 40 MG/1
1 TABLET 1/2 TO 1 HOUR BEFORE MORNING MEAL TABLET, DELAYED RELEASE ORAL ONCE A DAY
Status: ACTIVE | COMMUNITY

## 2025-05-13 RX ORDER — POTASSIUM CHLORIDE 1500 MG/1
1 TABLET WITH FOOD TABLET, EXTENDED RELEASE ORAL ONCE A DAY
Status: ACTIVE | COMMUNITY

## 2025-05-13 RX ORDER — PANTOPRAZOLE SODIUM 40 MG/1
1 TABLET 1/2 TO 1 HOUR BEFORE MORNING MEAL TABLET, DELAYED RELEASE ORAL ONCE A DAY
OUTPATIENT

## 2025-05-13 RX ORDER — ATORVASTATIN CALCIUM 20 MG/1
1 TABLET TABLET, FILM COATED ORAL ONCE A DAY
Status: ACTIVE | COMMUNITY

## 2025-05-13 RX ORDER — LEVOTHYROXINE SODIUM 50 UG/1
TABLET ORAL
Qty: 90 TABLET | Status: ACTIVE | COMMUNITY

## 2025-05-13 NOTE — HPI-TODAY'S VISIT:
Ms. Pena is a 69-year-old woman with a past medical history significant for colon diverticulosis presenting to establish care regarding elevated enzymes.  Labs on/1/25 notable for total bilirubin 0.5, alkaline phosphatase 98, AST 75, ALT 71, albumin 4.5, total protein 8.0; hepatitis A, B and C serologies nonreactive, ; WBC 3.4, hemoglobin 13.8, platelets 204.  She is overall doing well.  She denies any nausea, vomiting, heartburn, abdominal pain. She reports that her.  Bowel movements are often a harder consistency, small in quantity and require straining to pass.  She reports being told that she had elevated liver enzymes in the past.  She denies any significant alcohol intake.  Her family history is notable for mother has a history of liver cancer.   Outside records reviewed: She had a CT abdomen pelvis with contrast on 4/2/2022 that showed mild hazy perisigmoid fat stranding/edema consistent with acute or subacute diverticulitis, severe chronic right hydroureteronephrosis with suspected chronic distal ureteral obstruction in the pelvis. EGD and colonoscopy on 3/15/2019 ([facility]) EGD: Large hiatal hernia, gastric erythema status post biopsy showing no histopathologic change negative for H. pylori, normal duodenum status post biopsy negative for celiac sprue. Colonoscopy: Multiple sigmoid colon diverticula, grade 1 internal hemorrhoids

## 2025-05-23 ENCOUNTER — CLAIMS CREATED FROM THE CLAIM WINDOW (OUTPATIENT)
Dept: URBAN - METROPOLITAN AREA CLINIC 117 | Facility: CLINIC | Age: 70
End: 2025-05-23
Payer: MEDICARE

## 2025-05-23 ENCOUNTER — OFFICE VISIT (OUTPATIENT)
Dept: URBAN - METROPOLITAN AREA CLINIC 112 | Facility: CLINIC | Age: 70
End: 2025-05-23
Payer: MEDICARE

## 2025-05-23 DIAGNOSIS — K74.01 HEPATIC FIBROSIS, EARLY FIBROSIS: ICD-10-CM

## 2025-05-23 PROCEDURE — 76981 USE PARENCHYMA: CPT | Performed by: INTERNAL MEDICINE

## 2025-05-23 RX ORDER — FERROUS SULFATE 325(65) MG
1 TABLET TABLET ORAL
Status: ON HOLD | COMMUNITY

## 2025-05-23 RX ORDER — ATORVASTATIN CALCIUM 20 MG/1
1 TABLET TABLET, FILM COATED ORAL ONCE A DAY
Status: ACTIVE | COMMUNITY

## 2025-05-23 RX ORDER — AMLODIPINE BESYLATE 5 MG/1
1 TABLET TABLET ORAL ONCE A DAY
Status: ACTIVE | COMMUNITY

## 2025-05-23 RX ORDER — LOSARTAN POTASSIUM AND HYDROCHLOROTHIAZIDE 100; 12.5 MG/1; MG/1
1 TABLET TABLET, FILM COATED ORAL ONCE A DAY
Status: ACTIVE | COMMUNITY

## 2025-05-23 RX ORDER — POTASSIUM CHLORIDE 1500 MG/1
1 TABLET WITH FOOD TABLET, EXTENDED RELEASE ORAL ONCE A DAY
Status: ACTIVE | COMMUNITY

## 2025-05-23 RX ORDER — LEVOTHYROXINE SODIUM 50 UG/1
TABLET ORAL
Qty: 90 TABLET | Status: ACTIVE | COMMUNITY

## 2025-05-23 RX ORDER — PANTOPRAZOLE SODIUM 40 MG/1
1 TABLET 1/2 TO 1 HOUR BEFORE MORNING MEAL TABLET, DELAYED RELEASE ORAL ONCE A DAY
Status: ACTIVE | COMMUNITY

## 2025-05-24 ENCOUNTER — WEB ENCOUNTER (OUTPATIENT)
Dept: URBAN - METROPOLITAN AREA CLINIC 107 | Facility: CLINIC | Age: 70
End: 2025-05-24

## 2025-05-29 ENCOUNTER — DASHBOARD ENCOUNTERS (OUTPATIENT)
Age: 70
End: 2025-05-29

## 2025-06-04 ENCOUNTER — WEB ENCOUNTER (OUTPATIENT)
Dept: URBAN - METROPOLITAN AREA CLINIC 107 | Facility: CLINIC | Age: 70
End: 2025-06-04

## 2025-06-23 ENCOUNTER — WEB ENCOUNTER (OUTPATIENT)
Dept: URBAN - METROPOLITAN AREA CLINIC 107 | Facility: CLINIC | Age: 70
End: 2025-06-23

## 2025-06-24 ENCOUNTER — WEB ENCOUNTER (OUTPATIENT)
Dept: URBAN - METROPOLITAN AREA CLINIC 107 | Facility: CLINIC | Age: 70
End: 2025-06-24

## 2025-07-10 ENCOUNTER — OFFICE VISIT (OUTPATIENT)
Dept: URBAN - METROPOLITAN AREA CLINIC 113 | Facility: CLINIC | Age: 70
End: 2025-07-10
Payer: MEDICARE

## 2025-07-10 DIAGNOSIS — K59.04 CHRONIC IDIOPATHIC CONSTIPATION: ICD-10-CM

## 2025-07-10 DIAGNOSIS — R79.89 ELEVATED LFTS: ICD-10-CM

## 2025-07-10 DIAGNOSIS — K21.9 GERD WITHOUT ESOPHAGITIS: ICD-10-CM

## 2025-07-10 PROCEDURE — 99214 OFFICE O/P EST MOD 30 MIN: CPT | Performed by: INTERNAL MEDICINE

## 2025-07-10 RX ORDER — LEVOTHYROXINE SODIUM 50 UG/1
TABLET ORAL
Qty: 90 TABLET | Status: ACTIVE | COMMUNITY

## 2025-07-10 RX ORDER — WHEAT DEXTRIN 3 G/3.5 G
1 TABLESPOON IN 8 OUNCES OF NONCARBONATED BEVERAGE POWDER (GRAM) ORAL TWICE A DAY
OUTPATIENT

## 2025-07-10 RX ORDER — PANTOPRAZOLE SODIUM 40 MG/1
1 TABLET 1/2 TO 1 HOUR BEFORE MORNING MEAL TABLET, DELAYED RELEASE ORAL ONCE A DAY
OUTPATIENT

## 2025-07-10 RX ORDER — LOSARTAN POTASSIUM AND HYDROCHLOROTHIAZIDE 100; 12.5 MG/1; MG/1
1 TABLET TABLET, FILM COATED ORAL ONCE A DAY
Status: ACTIVE | COMMUNITY

## 2025-07-10 RX ORDER — AMLODIPINE BESYLATE 5 MG/1
1 TABLET TABLET ORAL ONCE A DAY
Status: ACTIVE | COMMUNITY

## 2025-07-10 RX ORDER — FAMOTIDINE 20 MG/1
1 TABLET AT BEDTIME AS NEEDED TABLET, FILM COATED ORAL ONCE A DAY
Qty: 30 | OUTPATIENT
Start: 2025-07-24

## 2025-07-10 RX ORDER — ATORVASTATIN CALCIUM 20 MG/1
1 TABLET TABLET, FILM COATED ORAL ONCE A DAY
Status: ACTIVE | COMMUNITY

## 2025-07-10 RX ORDER — PANTOPRAZOLE SODIUM 40 MG/1
1 TABLET 1/2 TO 1 HOUR BEFORE MORNING MEAL TABLET, DELAYED RELEASE ORAL ONCE A DAY
Status: ACTIVE | COMMUNITY

## 2025-07-10 RX ORDER — POTASSIUM CHLORIDE 1500 MG/1
1 TABLET WITH FOOD TABLET, EXTENDED RELEASE ORAL ONCE A DAY
Status: ACTIVE | COMMUNITY

## 2025-07-10 RX ORDER — FERROUS SULFATE 325(65) MG
1 TABLET TABLET ORAL
Status: ON HOLD | COMMUNITY

## 2025-07-10 NOTE — HPI-OTHER HISTORIES
Labs (4/1/25): total bilirubin 0.5, alkaline phosphatase 98, AST 75, ALT 71, albumin 4.5, total protein 8.0; hepatitis A, B and C serologies nonreactive, ; WBC 3.4, hemoglobin 13.8, platelets 204. CT abdomen pelvis with contrast (4/2/2022): mild hazy perisigmoid fat stranding/edema consistent with acute or subacute diverticulitis, severe chronic right hydroureteronephrosis with suspected chronic distal ureteral obstruction in the pelvis. EGD and colonoscopy on 3/15/2019 ([facility]) EGD: Large hiatal hernia, gastric erythema status post biopsy showing no histopathologic change negative for H. pylori, normal duodenum status post biopsy negative for celiac sprue. Colonoscopy: Multiple sigmoid colon diverticula, grade 1 internal hemorrhoids

## 2025-07-10 NOTE — HPI-TODAY'S VISIT:
Ms. Pena is a 69-year-old woman with a past medical history significant for colon diverticulosis presenting for follow up regarding elevated enzymes.  She was last seen in the office on 5/13/2025 for follow-up regarding asymptomatic mild elevation in liver enzymes attributed to metabolic dysfunction associated steatotic liver disease.  Additional labs were obtained to evaluate for hereditary and autoimmune etiologies chronic liver disease, and a FibroScan scheduled to evaluate for advanced fibrosis.  Her GERD symptoms were controlled on pantoprazole 40 mg daily.  Her chronic idiopathic constipation was suboptimally controlled, so she was recommended to add MiraLAX to the Benefiber powder twice daily.  Labs on 6/26/2025 showed total bilirubin 0.4, alkaline phosphatase 93, AST 99, , albumin 3.5, total protein 7.3; hereditary hemochromatosis not detected. Labs on 5/20/2025 showed FIB-4 index 3.94 (high risk for advanced fibrosis), iron 194, percent saturation 61, TIBC 318, ferritin 468; quantitative immunoglobulins IgG 2532, IgA 38, hemoglobin and 13; actin 41, JAMES negative. FibroScan (5/23/2025) showed no significant steatosis or fibrosis.  She is overall doing well.  She reports that her heartburn is fairly well-controlled on pantoprazole 40 mg daily with occasional Pepcid for breakthrough episodes.  No dysphagia.  No nausea, vomiting, abdominal pain.  Her constipation is fairly well-controlled using Benefiber daily.  No red blood per rectum.  She does not consume any alcohol.

## 2025-08-04 ENCOUNTER — WEB ENCOUNTER (OUTPATIENT)
Dept: URBAN - METROPOLITAN AREA CLINIC 107 | Facility: CLINIC | Age: 70
End: 2025-08-04